# Patient Record
Sex: FEMALE | Race: WHITE
[De-identification: names, ages, dates, MRNs, and addresses within clinical notes are randomized per-mention and may not be internally consistent; named-entity substitution may affect disease eponyms.]

---

## 2018-04-22 ENCOUNTER — HOSPITAL ENCOUNTER (INPATIENT)
Dept: HOSPITAL 68 - ERH | Age: 83
LOS: 3 days | Discharge: HOME HEALTH | DRG: 190 | End: 2018-04-25
Attending: INTERNAL MEDICINE
Payer: COMMERCIAL

## 2018-04-22 VITALS — SYSTOLIC BLOOD PRESSURE: 150 MMHG | DIASTOLIC BLOOD PRESSURE: 80 MMHG

## 2018-04-22 VITALS — HEIGHT: 61 IN | BODY MASS INDEX: 32.1 KG/M2 | WEIGHT: 170 LBS

## 2018-04-22 VITALS — DIASTOLIC BLOOD PRESSURE: 50 MMHG | SYSTOLIC BLOOD PRESSURE: 148 MMHG

## 2018-04-22 DIAGNOSIS — E78.5: ICD-10-CM

## 2018-04-22 DIAGNOSIS — I10: ICD-10-CM

## 2018-04-22 DIAGNOSIS — J44.0: Primary | ICD-10-CM

## 2018-04-22 DIAGNOSIS — K21.9: ICD-10-CM

## 2018-04-22 DIAGNOSIS — J44.1: ICD-10-CM

## 2018-04-22 DIAGNOSIS — K59.00: ICD-10-CM

## 2018-04-22 DIAGNOSIS — J20.9: ICD-10-CM

## 2018-04-22 DIAGNOSIS — Z79.51: ICD-10-CM

## 2018-04-22 DIAGNOSIS — K58.9: ICD-10-CM

## 2018-04-22 DIAGNOSIS — Z88.8: ICD-10-CM

## 2018-04-22 DIAGNOSIS — E03.9: ICD-10-CM

## 2018-04-22 DIAGNOSIS — J18.9: ICD-10-CM

## 2018-04-22 DIAGNOSIS — Z79.82: ICD-10-CM

## 2018-04-22 LAB
ABSOLUTE GRANULOCYTE CT: 7.5 /CUMM (ref 1.4–6.5)
APTT BLD: 31 SEC (ref 25–37)
BASOPHILS # BLD: 0 /CUMM (ref 0–0.2)
BASOPHILS NFR BLD: 0.4 % (ref 0–2)
EOSINOPHIL # BLD: 0.1 /CUMM (ref 0–0.7)
EOSINOPHIL NFR BLD: 0.8 % (ref 0–5)
ERYTHROCYTE [DISTWIDTH] IN BLOOD BY AUTOMATED COUNT: 13.8 % (ref 11.5–14.5)
GRANULOCYTES NFR BLD: 79.6 % (ref 42.2–75.2)
HCT VFR BLD CALC: 42.2 % (ref 37–47)
LYMPHOCYTES # BLD: 1.4 /CUMM (ref 1.2–3.4)
MCH RBC QN AUTO: 32.2 PG (ref 27–31)
MCHC RBC AUTO-ENTMCNC: 33.8 G/DL (ref 33–37)
MCV RBC AUTO: 95.1 FL (ref 81–99)
MONOCYTES # BLD: 0.4 /CUMM (ref 0.1–0.6)
PLATELET # BLD: 197 /CUMM (ref 130–400)
PMV BLD AUTO: 9.4 FL (ref 7.4–10.4)
PROTHROMBIN TIME: 11.8 SEC (ref 9.4–12.5)
RED BLOOD CELL CT: 4.44 /CUMM (ref 4.2–5.4)
WBC # BLD AUTO: 9.4 /CUMM (ref 4.8–10.8)

## 2018-04-22 PROCEDURE — 2NASP: CPT

## 2018-04-22 NOTE — ED DYSPNEA/ASTHMA COMPLAINT
History of Present Illness
 
General
Chief Complaint: Dyspnea (COPD, CHF, Other)
Stated Complaint: BIBA SOB
Source: patient
Exam Limitations: no limitations
 
Vital Signs & Intake/Output
Vital Signs & Intake/Output
 Vital Signs
 
 
Date Time Temp Pulse Resp B/P B/P Pulse O2 O2 Flow FiO2
 
     Mean Ox Delivery Rate 
 
 1631 96.3 106 22 173/94  95 Nasal 1.0L 
 
       Cannula  
 
 1445 96.1 100 18 173/81  92 Nasal 2.0L 
 
       Cannula  
 
 1343      96 Nasal 1.0L 
 
       Cannula  
 
 1252 96.4 103 25 152/92  98 Room Air Room Air 
 
 
 
Allergies
Coded Allergies:
linaclotide (From LINZESS) (Severe, WHEEZING, SOB 18)
tiotropium (UNKNOWN 18)
 
Reconcile Medications
Albuterol Sulfate (Proair Hfa) 90 MCG HFA.AER.AD   2 PUF INH Q4-6 PRN PRN RESP. 
(Reported)
Aspirin (Ecotrin*) 81 MG TABLET.DR   1 TAB PO DAILY HEART/BLOOD  (Reported)
Escitalopram Oxalate 10 MG TABLET   1 TAB PO DAILY MENTAL HEALTH  (Reported)
Hydrochlorothiazide 12.5 MG CAPSULE   1 CAP PO DAILY DIURETIC/BP  (Reported)
Levothyroxine Sodium 100 MCG TABLET   1 TAB PO DAILY THYROID  (Reported)
Losartan Potassium 25 MG TABLET   1 TAB PO DAILY BP  (Reported)
Lovastatin 20 MG TABLET   1 TAB PO DAILY CHOLESTEROL  (Reported)
Multiple Vitamin (Multivitamins) 1 EACH TABLET   1 TAB PO DAILY SUPPLEMENT  (
Reported)
 
Triage Note:
PT BIBA FROM HOME FOR INCREASED SOB SINCE EARLY
THIS MORNING.  PT FELT GREAT YESTERDAY, TOOK A
LINZESS PILL LAST NIGHT FOR THE FIRST TIME AND SOB
STARTED AROUND 0400, SUBSIDED AND THEN STARTED
AGAIN APPROX 2-3 HOURS AGO AFTER PT ATE BREAKFAST.
PT GIVEN DUO NEB EN ROUTE.
Triage Nurses Notes Reviewed? yes
Onset: Abrupt
Duration: hour(s):, constant, getting worse
Timing: recent history
Severity: mild
HPI:
87-year-old female comes into the emergency room for shortness of breath.  
Symptoms began around 4 AM this morning.  She came in by ambulance.  He denies 
any fever chills.  Associated cough.  She started a new medication yesterday.  
She denies any chest pain.  Denies any fever or vomiting.  She has been sick 
with some upper rest of her symptoms for a couple weeks but those symptoms have 
gotten better.  She denies any vomiting.  Denies any other associated symptoms.
(Ghanshyam Richardson)
 
Past History
 
Travel History
Traveled to [place] past 21 day No
 
Medical History
Any Pertinent Medical History? see below for history
Cardiovascular: hypertension, hyperlipidemia
Respiratory: COPD
Gastrointestinal: GERD
Endocrine: HYPOTHYROID
Blood Disorders: NONE
Cancer(s): NONE
History of MRSA: No
History of VRE: No
History of CDIFF: Yes
Pneumonia Vaccine: 10/01/14
Influenza Vaccine: 10/01/14
 
Surgical History
Surgical History: non-contributory
 
Psychosocial History
Who do you live with Patient/Self
Services at Home None
What is your primary language English
Tobacco Use: Quit >30 days ago
 
Family History
Family History, If Any:
MOTHER (CVA and DM).
FATHER (CHF, renal stones and gall stones).
SON (DM and MI at 48 years).
 
Hx Contributory? No
(Ghanshyam Richardson)
 
Review of Systems
 
Review of Systems
Constitutional:
Reports: see HPI. 
EENTM:
Reports: no symptoms. 
Respiratory:
Reports: see HPI. 
Cardiovascular:
Reports: no symptoms. 
GI:
Reports: no symptoms. 
Genitourinary:
Reports: no symptoms. 
Musculoskeletal:
Reports: no symptoms. 
Skin:
Reports: no symptoms. 
Neurological/Psychological:
Reports: no symptoms. 
Hematologic/Endocrine:
Reports: no symptoms. 
Immunologic/Allergic:
Reports: no symptoms. 
All Other Systems: Reviewed and Negative
(Ghanshyam Richardson)
 
Physical Exam
 
Physical Exam
General Appearance: alert, awake, moderate distress
Head: atraumatic
Eyes:
Bilateral: normal appearance. 
Ears, Nose, Throat: normal ENT inspection, hearing grossly normal
Neck: normal inspection
Respiratory: decreased breath sounds, rhonchi, wheezing
Cardiovascular: regular rate/rhythm, tachycardia
Gastrointestinal: soft
Extremities: normal inspection
Neurologic/Psych: awake, alert, normal gait, normal mood/affect
Skin: intact, normal color
 
Core Measures
ACS in differential dx? No
CVA/TIA Diagnosis No
Sepsis Present: No
Sepsis Focused Exam Completed? No
(Ghanshyam Richardson)
 
Progress
Differential Diagnosis: asthma, AMI, bronchitis, costochondritis, CHF, COPD, 
musculoskeletal pain, pericarditis, pulmonary embolism, pneumonia, pneumothorax,
rib fracture, unstable angina
Plan of Care:
 Orders
 
 
Procedure Date/time Status
 
Heart Healthy Diet  B Active
 
Regular Diet  D Complete
 
ED Holding Orders  1611 Active
 
Vital Signs  1611 Active
 
Code Status  1611 Active
 
Patient Data  1609 Active
 
Admit to inpatient  1607 Active
 
BLOOD CULTURE  1419 Active
 
Telemetry/Cardiac Monitor  1258 Active
 
TROPONIN LEVEL  1258 Complete
 
PARTIAL THROMBOPLASTIN TIME  1258 Complete
 
PROTHROMBIN TIME  1258 Complete
 
COMPREHENSIVE METABOLIC PANEL  1258 Complete
 
CBC WITHOUT DIFFERENTIAL  1258 Complete
 
B-TYPE NATRIURETIC PEP (BNP)  1258 Complete
 
EKG  1258 Active
 
Intake & Output  1249 Active
 
 
 Current Medications
 
 
  Sig/No Start time  Last
 
Medication Dose  Stop Time Status Admin
 
Magnesium Sulfate 1 GM ONCE ONE  1345 AC 
 
(Mag Sulfate in D5)    1744  1402
 
Dextrose/Water 100 ML    
 
(D5W)     
 
 
 Laboratory Tests
 
 
 
18 1340:
Anion Gap 7, Estimated GFR > 60, BUN/Creatinine Ratio 44.0  H, Glucose 107  H, 
Calcium 9.2, Total Bilirubin 0.9, AST 28, ALT 26, Alkaline Phosphatase 59, 
Troponin I < 0.01, Pro-B-Natriuretic Pept 552  H, Total Protein 7.2, Albumin 3.9
, Globulin 3.3, Albumin/Globulin Ratio 1.2, PT 11.8, INR 1.08, APTT 31, CBC w 
Diff NO MAN DIFF REQ, RBC 4.44, MCV 95.1, MCH 32.2  H, MCHC 33.8, RDW 13.8, MPV 
9.4, Gran % 79.6  H, Lymphocytes % 15.0  L, Monocytes % 4.2, Eosinophils % 0.8, 
Basophils % 0.4, Absolute Granulocytes 7.5  H, Absolute Lymphocytes 1.4, 
Absolute Monocytes 0.4, Absolute Eosinophils 0.1, Absolute Basophils 0
 Microbiology
 1442  BLOOD: Blood Culture - RECD
 1420  BLOOD: Blood Culture - RECD
 
 
Diagnostic Imaging:
Viewed by Me: Radiology Read.  Discussed w/RAD: Radiology Read. 
Radiology Impression: PATIENT: LUCAS DOOLEY  MEDICAL RECORD NO: [id number] PRESENT 
AGE: 87  PATIENT ACCOUNT NO: [id number] : 30  LOCATION: [facility] ORDERING 
PHYSICIAN: Ghanshyam CABRERA     SERVICE DATE:  EXAM TYPE: RAD - 
XRY-PORTABLE CHEST XRAY EXAMINATION: XR PORTABLE CHEST CLINICAL INFORMATION: 
Shortness of breath. COMPARISON: Chest radiograph 10/22/2010. TECHNIQUE: 
Portable frontal view of the chest was obtained. FINDINGS: Mild prominence of 
the pulmonary interstitium bilaterally may represent sequela of minimal chronic 
lung disease. There is minimal reticular airspace opacity in the right lung base
which is nonspecific. No pleural effusion. Cardiomediastinal silhouette and 
pulmonary vasculature are within normal limits. No acute osseous finding. 
IMPRESSION: Normal right basilar airspace opacity which is nonspecific. This may
be artifactual given underpenetration or possibly represent early infiltrate. 
DICTATED BY: Johan Rodriguez MD  DATE/TIME DICTATED:18 
:RAD.CONCEPCION  DATE/TIME TRANSCRIBED:18 CONFIDENTIAL, 
DO NOT COPY WITHOUT APPROPRIATE AUTHORIZATION.  <Electronically signed in Other 
Vendor System>                                                                  
                     SIGNED BY: Johan Rodriguez MD 18 1408
Initial ED EKG: normal sinus rhythm, rate (106)
(Ghanshyam Richardson)
 
Departure
 
Departure
Disposition: STILL A PATIENT
Condition: Stable
Clinical Impression
Primary Impression: Pneumonia
Secondary Impressions: Bronchitis
Referrals:
Christopher CHIU,Wellington CHEUNG (PCP/Family)
 
Departure Forms:
Customer Survey
General Discharge Information
 
Admission Note
Spoke With:
Cisco Mccauley MD
Documentation of Exam:
Documentation of any treatments & extenuating circumstances including Concerns 
Regarding Discharge (functional status, medication knowledge or non-compliance, 
living conditions, etc.) that warrant an admission rather than observation:  
Patient will require IV steroids.  Supplemental oxygen.  Respiratory care.  
Breathing treatments.  Patient was very tachypneic and short of breath upon 
arrival.  Questionable pneumonia.  IV antibiotics.  Medically not safe for 
discharge.
 
(Ghanshyam Richardson)
 
Admission Note
Documentation of Exam:
Documentation of any treatments & extenuating circumstances including Concerns 
Regarding Discharge (functional status, medication knowledge or non-compliance, 
living conditions, etc.) that warrant an admission rather than observation: 
 
 
PA/NP Co-Sign Statement
Statement:
ED Attending supervision documentation-
 
[X] I saw and evaluated the patient. I have also reviewed all the pertinent lab 
results and diagnostic results. I agree with the findings and the plan of care 
as documented in the PA's/NP's documentation. 
 
[X] I have reviewed the ED Record and agree with the PA's/NP's documentation.
 
[] Additions or exceptions (if any) to the PAs/NP's note and plan are 
summarized below:
[Patient appears in respiratory distress.  Questionable pneumonia seen on chest 
x-ray.  Patient will need IV antibiotics, nebulizers, pulmonary consultation]
 
(Pat CHIU,Robbie CONTRERAS)
 
Critical Care Note
 
Critical Care Note
Critical Care Time: non-applicable
(Ruben CABRERA,Ghanshyam)

## 2018-04-22 NOTE — RADIOLOGY REPORT
EXAMINATION:
XR PORTABLE CHEST
 
CLINICAL INFORMATION:
Shortness of breath.
 
COMPARISON:
Chest radiograph 10/22/2010.
 
TECHNIQUE:
Portable frontal view of the chest was obtained.
 
FINDINGS:
Mild prominence of the pulmonary interstitium bilaterally may represent
sequela of minimal chronic lung disease. There is minimal reticular airspace
opacity in the right lung base which is nonspecific.
 
No pleural effusion.
 
Cardiomediastinal silhouette and pulmonary vasculature are within normal
limits.
 
No acute osseous finding.
 
IMPRESSION:
Normal right basilar airspace opacity which is nonspecific. This may be
artifactual given underpenetration or possibly represent early infiltrate.

## 2018-04-22 NOTE — HISTORY & PHYSICAL
Bob CHIU,[facility] 04/22/18 1619:
General Information and HPI
MD Statement:
I have seen and personally examined LUCAS DOOLEY and documented this H&P.
 
The patient is a 87 year old F who presented with a patient stated chief 
complaint of [SOB].
 
History of Present Illness:
87-year-old female with a past medical history of hypertension, hyperlipidemia, 
COPD, GERD, hypothyroidism, and IBS presenting for shortness breath.  The 
patient's son states that the symptoms started several weeks ago when the 
patient had signs of a upper respiratory infection.  The son states that the 
patient went to the PCP on the 13th and was given a Z-Valente and poor air.  He 
states that the symptoms occurred 3-4 days for the PCP visit.  He states that 
she got a flu test which was negative and she was diagnosed with a cold.  He 
states that the patient was fine yesterday.  States that the patient went out 
shopping and had dinner family without any issues.  The son states that the 
symptoms started yesterday when the patient was started on a trial of Lanzess 
for constipation.  He states that the patient had several bowel movements.  He 
states that after taking the medication the patient began having symptoms of 
GERD which then led to an acute reemergence of her shortness of breath.  The 
shortness of breath is associated with cough.  The patient denies any fevers or 
chills.
 
Allergies/Medications
Allergies:
Coded Allergies:
linaclotide (From LINZESS) (Severe, WHEEZING, SOB 04/22/18)
tiotropium (UNKNOWN 04/22/18)
 
Home Med list
Albuterol Sulfate (Proair Hfa) 90 MCG HFA.AER.AD   2 PUF INH Q4-6 PRN PRN RESP. 
(Reported)
Aspirin (Ecotrin*) 81 MG TABLET.DR   1 TAB PO DAILY HEART/BLOOD  (Reported)
Escitalopram Oxalate 10 MG TABLET   1 TAB PO DAILY MENTAL HEALTH  (Reported)
Hydrochlorothiazide 12.5 MG CAPSULE   1 CAP PO DAILY DIURETIC/BP  (Reported)
Levothyroxine Sodium 100 MCG TABLET   1 TAB PO DAILY THYROID  (Reported)
Losartan Potassium 25 MG TABLET   1 TAB PO DAILY BP  (Reported)
Lovastatin 20 MG TABLET   1 TAB PO DAILY CHOLESTEROL  (Reported)
Multiple Vitamin (Multivitamins) 1 EACH TABLET   1 TAB PO DAILY SUPPLEMENT  (
Reported)
 
 
Past History
 
Travel History
Traveled to [place] past 21 day No
 
Medical History
Cardiovascular: hypertension, hyperlipidemia
Respiratory: COPD
Gastrointestinal: GERD
Endocrine: HYPOTHYROID
Blood Disorders: NONE
Cancer(s): NONE
History of MRSA: No
History of VRE: No
History of CDIFF: Yes
Pneumonia Vaccine: 10/01/14
Influenza Vaccine: 10/01/14
 
Surgical History
Surgical History: non-contributory
 
Past Family/Social History
 
Family History
Relations & Conditions if any
MOTHER (CVA and DM).
FATHER (CHF, renal stones and gall stones).
SON (DM and MI at 48 years).
 
 
Psychosocial History
Services at Home: None
 
Review of Systems
 
Review of Systems
Constitutional:
Reports: see HPI. 
 
Exam & Diagnostic Data
Last 24 Hrs of Vital Signs/I&O
 Vital Signs
 
 
Date Time Temp Pulse Resp B/P B/P Pulse O2 O2 Flow FiO2
 
     Mean Ox Delivery Rate 
 
04/22 2246 97.8 90 20 148/50  96 Nasal  
 
       Cannula  
 
04/22 2057       Nasal 2.0L 
 
       Cannula  
 
04/22 1835      98 Nasal 2.0L 
 
       Cannula  
 
04/22 1754 98.6 100 22 150/80  96 Nasal  
 
       Cannula  
 
04/22 1631 96.3 106 22 173/94  95 Nasal 1.0L 
 
       Cannula  
 
04/22 1445 96.1 100 18 173/81  92 Nasal 2.0L 
 
       Cannula  
 
04/22 1343      96 Nasal 1.0L 
 
       Cannula  
 
04/22 1252 96.4 103 25 152/92  98 Room Air Room Air 
 
 
 Intake & Output
 
 
 04/22 1600 04/22 0800 04/22 0000
 
Intake Total 0  
 
Output Total   
 
Balance 0  
 
    
 
Intake, Oral 0  
 
 
 
 
Physical Exam
General Appearance Alert, Oriented X3, Cooperative, No Acute Distress, appears 
fatigued
HEENT reddish/purplish ?circular pattern around eyes b/l
Cardiovascular Regular Rate, Normal S1, Normal S2
Lungs decreased breath sounds bilaterally.  Inspiratory crackles bilaterally in 
mid and lower base.
Abdomen Normal Bowel Sounds, Soft, No Tenderness
Extremities 2+ radial pulses
Last 24 Hrs of Labs/Raji:
 Laboratory Tests
 
04/22/18 2010:
Troponin I 0.05
 
04/22/18 2010:
Lactic Acid 1.5
 
04/22/18 1340:
Anion Gap 7, Estimated GFR > 60, BUN/Creatinine Ratio 44.0  H, Glucose 107  H, 
Calcium 9.2, Total Bilirubin 0.9, AST 28, ALT 26, Alkaline Phosphatase 59, 
Troponin I < 0.01, Pro-B-Natriuretic Pept 552  H, Total Protein 7.2, Albumin 3.9
, Globulin 3.3, Albumin/Globulin Ratio 1.2, PT 11.8, INR 1.08, APTT 31, CBC w 
Diff NO MAN DIFF REQ, RBC 4.44, MCV 95.1, MCH 32.2  H, MCHC 33.8, RDW 13.8, MPV 
9.4, Gran % 79.6  H, Lymphocytes % 15.0  L, Monocytes % 4.2, Eosinophils % 0.8, 
Basophils % 0.4, Absolute Granulocytes 7.5  H, Absolute Lymphocytes 1.4, 
Absolute Monocytes 0.4, Absolute Eosinophils 0.1, Absolute Basophils 0
 Microbiology
04/22 1901  URINE ROUT: Legionella Antigen - COLB
04/22 1901  URINE ROUT: Streptococcus pneumoniae Antigen (M - COLB
04/22 1442  BLOOD: Blood Culture - RECD
04/22 1420  BLOOD: Blood Culture - RECD
 
 
 
Assessment/Plan
Assessment:
87-year-old female with a past medical history of hypertension, hyperlipidemia, 
COPD, GERD, hypothyroidism, and IBS presenting for shortness breath.
 
#Shortness of breath secondary to pneumonia and bronchitis/COPD
Patient received ceftriaxone and azithromycin in the ED.
Also received IV Solu-Medrol in the ED
Chest x-ray:right basilar airspace opacity which is nonspecific. This may be
artifactual given underpenetration or possibly represent early infiltrate
WBC 9.4
Lactic acid 1.5
Troponin 0.01, .05
ProBNP 552
 
-f/u trop ekg x3
-f/u CT chest
-f/u UA and urinary antigens
-Continue ceftriaxone and azithromycin
-Continue TRC nebs
-Consider echo if no improvement
 
#HTN
-cont losartan, hydro-chlorothiazide,
 
#Chronic medical problems
-Continue  aspirin, levothyroxine, escitalopram, Protonix, atorvastatin
 
#FULL CODE
 
#DVT prophylaxisLovenox
 
As Ranked By This Provider
Problem List:
 1. Bronchitis
 
 2. Pneumonia
 
 
Core Measures/Misc (9/17)
 
Acute Coronary Syndrome
ACS Diagnosis: No
 
Congestive Heart Failure
Congestive Heart Failure Diagnosis No
 
Cerebrovascular Accident
CVA/TIA Diagnosis: No
 
VTE (View Protocol)
VTE Risk Factors Acute Medical Illness
No Mechanical VTE Prophylaxis d/t Other
No VTE Pharm Prophylaxis d/t NA PharmProphylax ordered
 
Sepsis (View protocol)
Sepsis Present: No
 
 
Cisco Mccauley 04/22/18 1716:
Attending MD Review Statement
 
Attending Statement
Attending MD Statement: examined this patient, discuss w/resident/PA/NP, agreed 
w/resident/PA/NP, discussed with family, reviewed EMR data (avail), discussed 
with nursing, discussed with case mgmt, reviewed images, amended to note
Attending Assessment/Plan:
87-year-old female comes for shortness of breath BIBEMS. Symptoms started 4 AM 
this morning.      She has been sick with some upper rest of her symptoms for a 
couple weeks. She denies any chest pain.  Denies any fever or vomiting. Patient 
is on oxygen supplementation. Vitals with borderline tachycardia and elevated 
bp.
 
Labs with probnp 500, Cr wnl, WBC 9, h/h stable. LFTs wnl. Trop x1 negative
 
Chest xray with early inflitrates. 
 
Patient being admitted for community acquired pneumonia and respiratroy 
insufficiency. Patient started on iv antibiotics, oxygen supplementaiton, obtain
ct chest, TRCs. Serial cardiac enzymes. Lactic acid. Consider ECHO if no 
improvement. Resume bp meds to control bp. gi/dvt prophyalxis 
 
 
[facility] 04/23/[facility] 0135:
Resident Review Statement
Resident Statement: examined this patient, discussed with intern, agreed with 
intern
Other Findings:
The patient is an 87-year-old woman with a past medical history of hypertension,
hyperlipidemia, COPD diagnosed in the 1960s, GERD, hypothyroidism, and IBS 
presenting with sudden onset of shortness breath since last night. 
 
Prior to current symptoms patient had an URTI 2 weeks ago and was treated with a
z pack and proair inhaler on 4/13 by her PCP with negative flu test. Her 
symptoms improved but she developed sudden onset of shortness of breath last 
night after taking linzess for constipation. She also notes severe wheezing. She
denied fever, chills, chest pain, palpitations, nausea or vomitting. Her 
shortness of breath improved after nebulizer treatment in the ER. Physical exam 
significant for bilateral scattered wheeze and reduced air entry in chest. No 
crackles heard. Labs show normal white count but her chest x ray shows right 
lung base questionable consolidation. Overall, her symptoms are consistent with 
an atypical or developing pneumonia or acute bronchitis.
 
Problem list
1. Shortness of breath secondary to probable atypical pneumonia
2. Acute bronchitis
3. Hypertension
4. Hypothyroidism
 
Plan
* Admit to general medicine
* Continue IV ceftriaxone 1 g daily and IV azithromycin 500 mg daily
* Follow up blood cultures
* Sputum culture
* Urine legionella and strep pneumo antigen
* Would pursue a non contrast CT chest to investigate early pneumonia changes
* Monitor vital signs closely
* TRC evaluation for nebulizer therapy (Note patient had a reaction to spiriva 
in the past)
* Consider continuing IV solumedrol in the AM. Patient received 125 mg once in 
the ER. 
* Serial EKG and troponin to rule out ACS
* If no improvement in symptoms will consider echocardiogram and cardiology 
evaluation
* Continue home meds for HTN  with losartan and HCTZ
* Continue synthroid 0.1 mg daily
* GI prophylaxis with IV protonix 40 mg daily 
* DVT prophylaxis with SC lovenox
* Patient is full code

## 2018-04-23 VITALS — SYSTOLIC BLOOD PRESSURE: 156 MMHG | DIASTOLIC BLOOD PRESSURE: 86 MMHG

## 2018-04-23 VITALS — DIASTOLIC BLOOD PRESSURE: 64 MMHG | SYSTOLIC BLOOD PRESSURE: 130 MMHG

## 2018-04-23 VITALS — SYSTOLIC BLOOD PRESSURE: 128 MMHG | DIASTOLIC BLOOD PRESSURE: 80 MMHG

## 2018-04-23 VITALS — DIASTOLIC BLOOD PRESSURE: 88 MMHG | SYSTOLIC BLOOD PRESSURE: 150 MMHG

## 2018-04-23 LAB
ABSOLUTE GRANULOCYTE CT: 5.7 /CUMM (ref 1.4–6.5)
BASOPHILS # BLD: 0 /CUMM (ref 0–0.2)
BASOPHILS NFR BLD: 0.7 % (ref 0–2)
EOSINOPHIL # BLD: 0 /CUMM (ref 0–0.7)
EOSINOPHIL NFR BLD: 0 % (ref 0–5)
ERYTHROCYTE [DISTWIDTH] IN BLOOD BY AUTOMATED COUNT: 13.4 % (ref 11.5–14.5)
GRANULOCYTES NFR BLD: 85 % (ref 42.2–75.2)
HCT VFR BLD CALC: 39.3 % (ref 37–47)
LYMPHOCYTES # BLD: 0.7 /CUMM (ref 1.2–3.4)
MCH RBC QN AUTO: 32.1 PG (ref 27–31)
MCHC RBC AUTO-ENTMCNC: 33.8 G/DL (ref 33–37)
MCV RBC AUTO: 95 FL (ref 81–99)
MONOCYTES # BLD: 0.3 /CUMM (ref 0.1–0.6)
PLATELET # BLD: 190 /CUMM (ref 130–400)
PMV BLD AUTO: 9.5 FL (ref 7.4–10.4)
RED BLOOD CELL CT: 4.14 /CUMM (ref 4.2–5.4)
WBC # BLD AUTO: 6.8 /CUMM (ref 4.8–10.8)

## 2018-04-23 NOTE — PN- HOUSESTAFF
Subjective
Follow-up For:
Pneumonia
COPD
Subjective:
No acute events overnight.  Patient states that she had some shortness of breath
which improved after nebulizers this morning.
 
Review of Systems
Constitutional:
Reports: see HPI. 
 
Objective
Last 24 Hrs of Vital Signs/I&O
 Vital Signs
 
 
Date Time Temp Pulse Resp B/P B/P Pulse O2 O2 Flow FiO2
 
     Mean Ox Delivery Rate 
 
 1011      98 Nasal 2.0L 
 
       Cannula  
 
 0827 98.4 98 22 150/88     
 
 0800      98 Nasal 2.0L 
 
       Cannula  
 
 0632 98.4 98 22 150/88  94 Nasal  
 
       Cannula  
 
 0542      97 Nasal 2.0L 
 
       Cannula  
 
 0200 98.1 74 18 128/80  98 Room Air  
 
 0000       Nasal 2.0L 
 
       Cannula  
 
 2246 97.8 90 20 148/50  96 Nasal  
 
       Cannula  
 
 2057       Nasal 2.0L 
 
       Cannula  
 
 1835      98 Nasal 2.0L 
 
       Cannula  
 
 1754 98.6 100 22 150/80  96 Nasal  
 
       Cannula  
 
 1631 96.3 106 22 173/94  95 Nasal 1.0L 
 
       Cannula  
 
 1445 96.1 100 18 173/81  92 Nasal 2.0L 
 
       Cannula  
 
 1343      96 Nasal 1.0L 
 
       Cannula  
 
 
 Intake & Output
 
 
  1600  0800  0000
 
Intake Total  100 220
 
Output Total 100  
 
Balance -100 100 220
 
    
 
Intake, IV   20
 
Intake, Oral  100 200
 
Number  0 
 
Bowel   
 
Movements   
 
Output, Urine 100  
 
Patient  171 lb 180 lb
 
Weight   
 
Weight  Bed scale Reported by Patient
 
Measurement   
 
Method   
 
 
 
 
Physical Exam
General Appearance: Alert, Oriented X3, Cooperative
Cardiovascular: Normal S1, Normal S2, tachycardia
Lungs: decreased breath sounds, inspiratory wheezing
Abdomen: Normal Bowel Sounds, Soft, No Tenderness
Vascular: 2+ radial pulses
Current Medications:
 Current Medications
 
 
  Sig/No Start time  Last
 
Medication Dose Route Stop Time Status Admin
 
Albuterol Sulfate 3 ML  AC 
 
  INH   0950
 
Aspirin Buffered 81 MG DAILY  0900 AC 
 
  PO   0827
 
Atorvastatin Calcium 5 MG 1700  1800 AC 
 
  PO   1937
 
Azithromycin 500 MG DAILY@1530  1530 AC 
 
Sodium Chloride 250 ML IV   
 
Azithromycin 0 .STK-MED ONE  1713 DC 
 
  PO   
 
Azithromycin 500 MG ONCE ONE  1415 DC 
 
Dextrose/Water 250 ML IV  1514  1546
 
Ceftriaxone Sodium 1,000 MG DAILY@1515  1515 AC 
 
  IV   
 
Ceftriaxone Sodium 0 .STK-MED ONE  1459 DC 
 
  .ROUTE   
 
Ceftriaxone Sodium 1,000 MG ONCE ONE  1415 DC 
 
  IV  1416  1514
 
Diphenhydramine HCl 0 .STK-MED ONE  1311 DC 
 
  .ROUTE   
 
Enoxaparin Sodium 40 MG DAILY  1814 AC 
 
  SC   0827
 
Escitalopram Oxalate 10 MG AT BEDTIME  2100 AC 
 
  PO   2144
 
Hydrochlorothiazide 12.5 MG DAILY  0900 AC 
 
  PO   0826
 
Levothyroxine Sodium 0.1 MG DAILY AC  0700 AC 
 
  PO   0640
 
Losartan Potassium 25 MG DAILY  0900 AC 
 
  PO   0827
 
Magnesium Sulfate 1 GM ONCE ONE  1345 DC 
 
Dextrose/Water 100 ML IV  1744  1402
 
Methylprednisolone 40 MG Q12  0900 AC 
 
  IV   1016
 
Methylprednisolone 0 .STK-MED ONE  1312 DC 
 
  .ROUTE   
 
Metronidazole 0 .STK-MED ONE  1713 DC 
 
  PO   
 
Multivitamins  1 TAB DAILY  0900 AC 
 
Therapeutic  PO   0826
 
Omeprazole 20 MG DAILY AC  1830 DC 
 
  PO   
 
Pantoprazole Sodium 40 MG DAILY  1945 AC 
 
  IV   0827
 
 
 
 
Last 24 Hrs of Lab/Raji Results
Last 24 Hrs of Labs/Mics:
 Laboratory Tests
 
18 0925:
Troponin I 0.05
 
18 0920:
Urine Color YEL, Urine Clarity HAZY  H, Urine pH 6.0, Ur Specific Gravity 1.025,
Urine Protein 30  H, Urine Ketones NEG, Urine Nitrite NEG, Urine Bilirubin NEG, 
Urine Urobilinogen 0.2, Ur Leukocyte Esterase NEG, Ur Microscopic SEDIMENT 
EXAMINED, Urine RBC 1-3, Urine WBC 3-5  H, Ur Epithelial Cells PACKD  H, Urine 
Bacteria RARE  H, Hyaline Casts 1-3  H, Granular Casts 1-3  H, Urine Mucus FEW, 
Urine Hemoglobin NEG, Urine Glucose NEG
 
18 0200:
Anion Gap 6, Estimated GFR > 60, BUN/Creatinine Ratio 30.0  H, Troponin I 0.06, 
CBC w Diff MAN DIFF ORDERED, RBC 4.14  L, MCV 95.0, MCH 32.1  H, MCHC 33.8, RDW 
13.4, MPV 9.5, Gran % 85.0  H, Lymphocytes % 9.7  L, Monocytes % 4.6, 
Eosinophils % 0, Basophils % 0.7, Absolute Granulocytes 5.7, Segmented 
Neutrophils 78  H, Band Neutrophils 3, Absolute Lymphocytes 0.7  L, Lymphocytes 
16  L, Monocytes 3, Absolute Monocytes 0.3, Absolute Eosinophils 0, Absolute 
Basophils 0, Platelet Estimate ADEQUATE, Anisocytosis 1+, Stomatocytes 1+
 
18 2010:
Troponin I 0.05
 
18 2010:
Lactic Acid 1.5
 
18 1340:
Anion Gap 7, Estimated GFR > 60, BUN/Creatinine Ratio 44.0  H, Glucose 107  H, 
Calcium 9.2, Total Bilirubin 0.9, AST 28, ALT 26, Alkaline Phosphatase 59, 
Troponin I < 0.01, Pro-B-Natriuretic Pept 552  H, Total Protein 7.2, Albumin 3.9
, Globulin 3.3, Albumin/Globulin Ratio 1.2, PT 11.8, INR 1.08, APTT 31, CBC w 
Diff NO MAN DIFF REQ, RBC 4.44, MCV 95.1, MCH 32.2  H, MCHC 33.8, RDW 13.8, MPV 
9.4, Gran % 79.6  H, Lymphocytes % 15.0  L, Monocytes % 4.2, Eosinophils % 0.8, 
Basophils % 0.4, Absolute Granulocytes 7.5  H, Absolute Lymphocytes 1.4, 
Absolute Monocytes 0.4, Absolute Eosinophils 0.1, Absolute Basophils 0
 Microbiology
920  URINE ROUT: Legionella Antigen - COMP
920  URINE ROUT: Streptococcus pneumoniae Antigen (M - COMP
 07  LOWER RESP: Respiratory Culture - COLB
 07  LOWER RESP: Gram Stain - COLB
 1442  BLOOD: Blood Culture - RECD
 142  BLOOD: Blood Culture - RECD
 
 
 
Assessment/Plan
Assessment:
87-year-old female with a past medical history of hypertension, hyperlipidemia, 
COPD, GERD, hypothyroidism, and IBS presenting for shortness breath.
 
#Shortness of breath secondary to pneumonia and bronchitis/COPD
Patient received ceftriaxone and azithromycin, IV Solu-Medrol in ED
Chest x-ray:right basilar airspace opacity which is nonspecific. may be 
artifactual or possibly represent early infiltrate
WBC 9.4 -> 6.8
Lactic acid 1.5
Troponin 0.01, .05, .06, .05
ProBNP 552
flu, urinary antigens negative
CT chest: emphysema with prominent anterior right middle lobe bleb
 
-f/u echo
-Follow up speech and swallow eval for possible aspiration and history of 
dysphasia
-Continue IV steroids every 12
-Start chest PT
-Continue ceftriaxone and azithromycin. STOP if cx negative tomorrow
-Continue TRC nebs
-Consider echo if no improvement
 
#HTN
-cont losartan, hydro-chlorothiazide,
 
#Chronic medical problems
-Continue  aspirin, levothyroxine, escitalopram, Protonix, atorvastatin
 
#FULL CODE
 
#DVT prophylaxisLovenox
Problem List:
 1. Pneumonia
 
 2. Bronchitis
 
Pain Ratin
Pain Location:
none
Pain Goal: Pain 4 or less
Pain Plan:
pathway
Tomorrow's Labs & Rationales:
none

## 2018-04-23 NOTE — CT SCAN REPORT
EXAMINATION:
CT CHEST WITHOUT CONTRAST
 
CLINICAL INFORMATION:
Cough, wheeze and sudden shortness of breath.
 
COMPARISON:
Chest x-ray same day and CT abdomen pelvis 01/18/2015
 
TECHNIQUE:
Multidetector volumetric CT imaging of the chest was done. Axial MIP volume
rendering provided. Sagittal and coronal reformatted images were obtained.
 
DLP:
394 mGy-cm
 
FINDINGS:
The heart is normal in size. Coronary artery calcifications are present. No
pericardial effusion. Thoracic aorta is normal in caliber and demonstrates
mild to moderate atherosclerotic disease.
 
Central airways are patent. Lungs are well aerated. No lobar consolidation.
Similar scarring of the right middle lobe and right lung base. Mild
emphysematous changes with a suspected approximately 4.5 cm bleb within the
anterior right middle lobe which was incompletely visualized on CT abdomen
pelvis imaging from January 2015. A few punctate nodules of the lung bases
are stable.
 
Visualized portion of the upper abdomen are grossly unremarkable.
 
Diffuse degenerative changes of the spine.
 
 
IMPRESSION:
1.  No lobar consolidation or pleural effusion.
2.  Mild emphysema with prominent anterior right middle lobe bleb.

## 2018-04-23 NOTE — PN- ATT ADDEND
Attending Addendum
Attending Brief Note
Patient seen and examined, feels better overall. Breathing has improved. Did 
mention that she has some difficulty swallowing. 
 
Vital Signs
 
 
Date Time Temp Pulse Resp B/P B/P Pulse O2 O2 Flow FiO2
 
     Mean Ox Delivery Rate 
 
04/23 1011      98 Nasal 2.0L 
 
       Cannula  
 
04/23 0827 98.4 98 22 150/88     
 
04/23 0800      98 Nasal 2.0L 
 
       Cannula  
 
04/23 0632 98.4 98 22 150/88  94 Nasal  
 
       Cannula  
 
04/23 0542      97 Nasal 2.0L 
 
       Cannula  
 
04/23 0200 98.1 74 18 128/80  98 Room Air  
 
04/23 0000       Nasal 2.0L 
 
       Cannula  
 
04/22 2246 97.8 90 20 148/50  96 Nasal  
 
       Cannula  
 
04/22 2057       Nasal 2.0L 
 
       Cannula  
 
04/22 1835      98 Nasal 2.0L 
 
       Cannula  
 
04/22 1754 98.6 100 22 150/80  96 Nasal  
 
       Cannula  
 
04/22 1631 96.3 106 22 173/94  95 Nasal 1.0L 
 
       Cannula  
 
04/22 1445 96.1 100 18 173/81  92 Nasal 2.0L 
 
       Cannula  
 
04/22 1343      96 Nasal 1.0L 
 
       Cannula  
 
04/22 1252 96.4 103 25 152/92  98 Room Air Room Air 
 
 
on exam; 
aox3, nad. 
cv: s1,s2, rrr
resp; decreased bs overall. 
abd; soft, nt, bs+
ext; no edema
 
 Laboratory Tests
 
 
 04/23 04/23
 
 0925 0920
 
Chemistry  
 
  Troponin I Pending 
 
Urines  
 
  Urine Color (YEL,AMB,STR)  YEL
 
  Urine Clarity (CLEAR)  HAZY  H
 
  Urine pH (5.0 - 8.0)  6.0
 
  Ur Specific Gravity (1.001 - 1.035)  1.025
 
  Urine Protein (NEG,<30 MG/DL)  30  H
 
  Urine Ketones (NEG)  NEG
 
  Urine Nitrite (NEG)  NEG
 
  Urine Bilirubin (NEG)  NEG
 
  Urine Urobilinogen (0.1  -  1.0 EU/dl)  0.2
 
  Ur Leukocyte Esterase (NEG)  NEG
 
  Ur Microscopic  SEDIMENT EXAMINED
 
  Urine RBC (0 - 5 /HPF)  1-3
 
  Urine WBC (0 - 2 /HPF)  3-5  H
 
  Ur Epithelial Cells (NONE,FEW)  PACKD  H
 
  Urine Bacteria (NEG/NONE)  RARE  H
 
  Hyaline Casts (0/LPF)  1-3  H
 
  Granular Casts (NONE /LPF)  1-3  H
 
  Urine Mucus (FEW,NONE)  FEW
 
  Urine Hemoglobin (NEG)  NEG
 
  Urine Glucose (N MG/DL)  NEG
 
 
 
 
 04/23 04/22 04/22
 
 0200 2010 2010
 
Chemistry   
 
  Sodium (137 - 145 mmol/L) 138  
 
  Potassium (3.5 - 5.1 mmol/L) 3.8  
 
  Chloride (98 - 107 mmol/L) 97  L  
 
  Carbon Dioxide (22 - 30 mmol/L) 34  H  
 
  Anion Gap (5 - 16) 6  
 
  BUN (7 - 17 mg/dL) 15  
 
  Creatinine (0.5 - 1.0 mg/dL) 0.5  
 
  Estimated GFR (>60 ml/min) > 60  
 
  BUN/Creatinine Ratio (7 - 25 %) 30.0  H  
 
  Lactic Acid (0.7 - 2.1 mmol/L)   1.5
 
  Troponin I (< 0.11 ng/ml) 0.06 0.05 
 
Hematology   
 
  CBC w Diff MAN DIFF ORDERED  
 
  WBC (4.8 - 10.8 /CUMM) 6.8  
 
  RBC (4.20 - 5.40 /CUMM) 4.14  L  
 
  Hgb (12.0 - 16.0 G/DL) 13.3  
 
  Hct (37 - 47 %) 39.3  
 
  MCV (81.0 - 99.0 FL) 95.0  
 
  MCH (27.0 - 31.0 PG) 32.1  H  
 
  MCHC (33.0 - 37.0 G/DL) 33.8  
 
  RDW (11.5 - 14.5 %) 13.4  
 
  Plt Count (130 - 400 /CUMM) 190  
 
  MPV (7.4 - 10.4 FL) 9.5  
 
  Gran % (42.2 - 75.2 %) 85.0  H  
 
  Lymphocytes % (20.5 - 51.1 %) 9.7  L  
 
  Monocytes % (1.7 - 9.3 %) 4.6  
 
  Eosinophils % (0 - 5 %) 0  
 
  Basophils % (0.0 - 2.0 %) 0.7  
 
  Absolute Granulocytes (1.4 - 6.5 /CUMM) 5.7  
 
  Segmented Neutrophils (42.2 - 75.2 %) 78  H  
 
  Band Neutrophils (0.0 - 5.0 %) 3  
 
  Absolute Lymphocytes (1.2 - 3.4 /CUMM) 0.7  L  
 
  Lymphocytes (20.5 - 51.1 %) 16  L  
 
  Monocytes (1.7 - 9.3 %) 3  
 
  Absolute Monocytes (0.10 - 0.60 /CUMM) 0.3  
 
  Absolute Eosinophils (0.0 - 0.7 /CUMM) 0  
 
  Absolute Basophils (0.0 - 0.2 /CUMM) 0  
 
  Platelet Estimate (ADEQUATE) ADEQUATE  
 
  Anisocytosis 1+  
 
  Stomatocytes 1+  
 
 
 
 
 04/22
 
 1340
 
Chemistry 
 
  Sodium (137 - 145 mmol/L) 137
 
  Potassium (3.5 - 5.1 mmol/L) 3.9
 
  Chloride (98 - 107 mmol/L) 97  L
 
  Carbon Dioxide (22 - 30 mmol/L) 34  H
 
  Anion Gap (5 - 16) 7
 
  BUN (7 - 17 mg/dL) 22  H
 
  Creatinine (0.5 - 1.0 mg/dL) 0.5
 
  Estimated GFR (>60 ml/min) > 60
 
  BUN/Creatinine Ratio (7 - 25 %) 44.0  H
 
  Glucose (65 - 99 mg/dL) 107  H
 
  Calcium (8.4 - 10.2 mg/dL) 9.2
 
  Total Bilirubin (0.2 - 1.3 mg/dL) 0.9
 
  AST (14 - 36 U/L) 28
 
  ALT (9 - 52 U/L) 26
 
  Alkaline Phosphatase (<127 U/L) 59
 
  Troponin I (< 0.11 ng/ml) < 0.01
 
  Pro-B-Natriuretic Pept (<125 pg/mL) 552  H
 
  Total Protein (6.3 - 8.2 g/dL) 7.2
 
  Albumin (3.5 - 5.0 g/dL) 3.9
 
  Globulin (1.9 - 4.2 gm/dL) 3.3
 
  Albumin/Globulin Ratio (1.1 - 2.2 %) 1.2
 
Coagulation 
 
  PT (9.4 - 12.5 SEC) 11.8
 
  INR (0.90 - 1.19) 1.08
 
  APTT (25 - 37 SEC) 31
 
Hematology 
 
  CBC w Diff NO MAN DIFF REQ
 
  WBC (4.8 - 10.8 /CUMM) 9.4
 
  RBC (4.20 - 5.40 /CUMM) 4.44
 
  Hgb (12.0 - 16.0 G/DL) 14.3
 
  Hct (37 - 47 %) 42.2
 
  MCV (81.0 - 99.0 FL) 95.1
 
  MCH (27.0 - 31.0 PG) 32.2  H
 
  MCHC (33.0 - 37.0 G/DL) 33.8
 
  RDW (11.5 - 14.5 %) 13.8
 
  Plt Count (130 - 400 /CUMM) 197
 
  MPV (7.4 - 10.4 FL) 9.4
 
  Gran % (42.2 - 75.2 %) 79.6  H
 
  Lymphocytes % (20.5 - 51.1 %) 15.0  L
 
  Monocytes % (1.7 - 9.3 %) 4.2
 
  Eosinophils % (0 - 5 %) 0.8
 
  Basophils % (0.0 - 2.0 %) 0.4
 
  Absolute Granulocytes (1.4 - 6.5 /CUMM) 7.5  H
 
  Absolute Lymphocytes (1.2 - 3.4 /CUMM) 1.4
 
  Absolute Monocytes (0.10 - 0.60 /CUMM) 0.4
 
  Absolute Eosinophils (0.0 - 0.7 /CUMM) 0.1
 
  Absolute Basophils (0.0 - 0.2 /CUMM) 0
 
 
A/P: 86 y/o F with pmh sig for hypertension, hyperlipidemia, COPD, GERD, 
hypothyroidism, and IBS admitted with shortness of breath, possible community-
acquired pneumonia or bronchitis as well as acute COPD exacerbation. 
 
We have added steroids.  Patient with swallow evaluation.  If all of her 
cultures remain negative then will consider stopping antibiotics.  There is no 
lobar consolidation on the chest CT.  Please get echo.  Follow-up troponins.
Continue other current meds.
DVT px; Lovenox. 
PT eval.

## 2018-04-24 VITALS — SYSTOLIC BLOOD PRESSURE: 136 MMHG | DIASTOLIC BLOOD PRESSURE: 72 MMHG

## 2018-04-24 VITALS — DIASTOLIC BLOOD PRESSURE: 80 MMHG | SYSTOLIC BLOOD PRESSURE: 160 MMHG

## 2018-04-24 VITALS — SYSTOLIC BLOOD PRESSURE: 150 MMHG | DIASTOLIC BLOOD PRESSURE: 60 MMHG

## 2018-04-24 NOTE — PN- ATT ADDEND
Attending Addendum
Attending Brief Note
Patient seen and examined, offers no complaints.  She still has the feeling 
SOMETHING gets stuck in the throat.  She says that she gets anxiety about 
everything.  Housestaff also spoke with her son this morning who confirms that 
patient is a very anxious person overall.  We told her about the barium swallow 
an echocardiogram this morning.
 
Vital Signs
 
 
Date Time Temp Pulse Resp B/P B/P Pulse O2 O2 Flow FiO2
 
     Mean Ox Delivery Rate 
 
04/24 0919 97.6 73 20 136/72     
 
04/24 0901      98 Room Air Room Air 
 
04/24 0539 97.6 73 20 136/72  93 Room Air  
 
04/23 2154 98.1 84 18 156/86  95   
 
04/23 1900      94 Room Air  
 
04/23 1428 98.5 85 20 130/64  96   
 
04/23 1348      96 Room Air  
 
 
on exam; 
aox3, nad. 
cv: s1,s2, rrr
resp; decreased bs overall. 
abd; soft, nt, bs+
ext; no edema
 
no labs. 
 
A/P;  88 y/o F with pmh sig for hypertension, hyperlipidemia, COPD, GERD, 
hypothyroidism, and IBS admitted with shortness of breath, less likely community
-acquired pneumonia. Andreas has acute bronchitis as well as acute COPD 
exacerbation. 
 
Patient to get modified barium swallow.  Patient with echocardiogram.  Continue 
prednisone and will start the taper.  Continue azithromycin orally.  Continue 
TRC nebs and other current meds.  Agree with switching PPI to oral.  Patient on 
Lovenox for DVT prophylaxis.  PT eval will be ordered.  Will determine what 
physical therapy will recommend in terms of her disposition.

## 2018-04-24 NOTE — RADIOLOGY REPORT
EXAMINATION:
XR MODIFIED BARIUM SWALLOW
 
CLINICAL INFORMATION:
Signs and symptoms of aspiration.
 
COMPARISON:
None.
 
TECHNIQUE:
A modified barium swallow was performed with speech pathologist in
attendance. Pur?e, honey thick, nectar thick, thin, bread, and cracker
consistencies were given to the patient and the swallowing mechanism was
observed fluoroscopically with several spot films taken using the last image
hold feature.
 
FLUOROSCOPY TIME:
2 minutes 44 seconds.
 
FINDINGS:
With all consistencies, the oral phase of swallowing is normal with no
difficulty in the oral bolus formation or posterior propagation. With thin
liquids, there is trace penetration of contrast seen without sensation. No
penetration is observed with puree, honey, nectar, bread, or cracker
consistency. No laryngeal or nasopharyngeal aspiration seen. Mild pooling of
contrast is noted in the valleculae with all consistencies.
 
IMPRESSION:
 
1. Silent trace penetration of contrast is seen with thin liquids.
2. Mild pooling of contrast in the valleculae with all consistencies.
3. Speech pathologist assessment issued separately.

## 2018-04-24 NOTE — PN- HOUSESTAFF
Subjective
Follow-up For:
Bronchitis complicated with COPD.
Complaints: on and off cough
Subjective:
Patient seen and examined at bedside. No overnight events.  Patient complains of
cough with whitish sputum production early morning.  Otherwise she feels fine.  
She slept okay.  She denies fever, shortness of breath, chest pain
 
Review of Systems
Constitutional:
Reports: no symptoms, see HPI. 
 
Objective
Last 24 Hrs of Vital Signs/I&O
 Vital Signs
 
 
Date Time Temp Pulse Resp B/P B/P Pulse O2 O2 Flow FiO2
 
     Mean Ox Delivery Rate 
 
 0919 97.6 73 20 136/72     
 
 0901      98 Room Air Room Air 
 
 0539 97.6 73 20 136/72  93 Room Air  
 
 2154 98.1 84 18 156/86  95   
 
 1900      94 Room Air  
 
 1428 98.5 85 20 130/64  96   
 
 1348      96 Room Air  
 
 
 Intake & Output
 
 
  1600  0800  0000
 
Intake Total  130 250
 
Output Total   
 
Balance  130 250
 
    
 
Intake, IV  10 10
 
Intake, Oral  120 240
 
Patient   169 lb
 
Weight   
 
 
 
 
Physical Exam
General Appearance: Alert, Oriented X3, Cooperative, No Acute Distress
Cardiovascular: Regular Rate, Normal S1, Normal S2, No Murmurs
Lungs: left lung base wheezing
Abdomen: Normal Bowel Sounds, Soft, No Tenderness
Neurological: Normal Speech, Strength at 5/5 X4 Ext, Normal Tone, Sensation 
Intact
Extremities: No Edema
Current Medications:
 Current Medications
 
 
  Sig/No Start time  Last
 
Medication Dose Route Stop Time Status Admin
 
Albuterol Sulfate 3 ML TID  0900 AC 
 
  INH   1321
 
Aspirin Buffered 81 MG DAILY  0900 AC 
 
  PO   0919
 
Atorvastatin Calcium 5 MG 1700  1800 AC 
 
  PO   1702
 
Azithromycin 500 MG DAILY  0900 AC 
 
  PO   0925
 
Azithromycin 500 MG DAILY@1530  1530 DC 
 
Sodium Chloride 250 ML IV   1406
 
Ceftriaxone Sodium 1,000 MG DAILY@1515  1515 DC 
 
  IV   1406
 
Docusate Sodium 100 MG BID  1100 AC 
 
  PO   
 
Enoxaparin Sodium 40 MG DAILY  1814 AC 
 
  SC   0919
 
Escitalopram Oxalate 10 MG AT BEDTIME  2100 AC 
 
  PO   2021
 
Guaifenesin/ 10 ML Q6P PRN  0845 AC 
 
Dextromethorphan  PO   
 
Hydrochlorothiazide 12.5 MG DAILY  0900 AC 
 
  PO   0913
 
Levothyroxine Sodium 0.1 MG DAILY AC  0700 AC 
 
  PO   0458
 
Losartan Potassium 25 MG DAILY  09 AC 
 
  PO   0919
 
Methylprednisolone 40 MG Q12  09 DC 
 
  IV   202
 
Multivitamins  1 TAB DAILY  09 AC 
 
Therapeutic  PO   08
 
Omeprazole 40 MG DAILY AC  0832 AC 
 
  PO   09
 
Pantoprazole Sodium 40 MG DAILY  1945 DC 
 
  IV   0827
 
Patient Medication  1 ED ONE ONE  1715 DC 
 
Teaching  ED  1716  
 
Prednisone 40 MG DAILY  0900 AC 
 
  PO   09
 
Senna 187 MG AT BEDTIME  2100 AC 
 
  PO   
 
 
 
 
Assessment/Plan
Assessment:
87-year-old female with a past medical history of hypertension, hyperlipidemia, 
COPD, GERD, hypothyroidism, and IBS presenting for shortness breath.
 
#Shortness of breath secondary to bronchitis/COPD
Patient received ceftriaxone and azithromycin.  We will discontinue the same.  
We will start her on azithromycin 500 daily.  We will change IV Solu-Medrol to 
by mouth prednisone.  We will add Robitussin cough syrup.  Continue TRC 
nebulization.
-Patient was seen by speech and swallow eval yesterday who recommended 
mechanical soft diet but the patient refused.  Hence she was put on regular diet
and explained the risk of aspiration.  They also offered barium swallow test but
the patient refused yesterday.  Today after talking to the son and patient, 
about the importance of daily and swallow to rule out any risk of aspiration, 
they both Agreed upon going for barium swallow today.  We will check 
echocardiogram to rule out any other causes of shortness of breath.  IV Protonix
changed to by mouth omeprazole.
 
#HTN
-cont losartan, hydro-chlorothiazide,
 
#Chronic medical problems
-Continue  aspirin, levothyroxine, escitalopram, Protonix, atorvastatin
 
#FULL CODE
#DVT prophylaxisLovenox
#Physical therapy to help with discharge disposition.
Problem List:
 1. Bronchitis
 
Pain Ratin
Pain Location:
none
Pain Goal: Remain pain free
Pain Plan:
tylenol
Tomorrow's Labs & Rationales:
none

## 2018-04-25 VITALS — DIASTOLIC BLOOD PRESSURE: 80 MMHG | SYSTOLIC BLOOD PRESSURE: 146 MMHG

## 2018-04-25 VITALS — SYSTOLIC BLOOD PRESSURE: 146 MMHG | DIASTOLIC BLOOD PRESSURE: 80 MMHG

## 2018-04-25 NOTE — DISCHARGE SUMMARY
Visit Information
 
Visit Dates
Admission Date:
04/22/18
 
Discharge Date:
04/25/18
 
 
Hospital Course
 
Course
Attending Physician:
Chantel Parrish MD
 
Primary Care Physician:
Wellington White MD
 
Hospital Course:
87-year-old female with a past medical history of hypertension, hyperlipidemia, 
COPD, GERD, hypothyroidism, and IBS presented for shortness breath.  The patient
's son states that the symptoms started several weeks ago when the patient had 
signs of a upper respiratory infection.  The son stated that the patient went to
the PCP on the april 13th and was given a Z-Valente and pro air.  He stated that the
symptoms occurred 3-4 days after PCP visit.  He states that she got a flu test 
which was negative and she was diagnosed with a cold.  The son stated that the 
symptoms started a day prior to admission when the patient was started on a 
trial of Lanzess for constipation.  He stated that the patient had several bowel
movements.  He stated that after taking the medication the patient began having 
symptoms of GERD which then led to an acute reemergence of her shortness of 
breath.  The shortness of breath is associated with cough.  The patient denies 
any fevers or chills.
 
Hospital course
#Shortness of breath secondary to bronchitis/COPD
Patient received ceftriaxone and azithromycin initially which was discontinued 
in view of bronchitis/COPD.  Patient continued on by mouth steroids and 
azithromycin. Continued TRC nebulization.  Patient advised to follow-up with her
primary care physician and also follow up her echo results.  She was given 
referral to Dr. Varela.  Patient was sent home with nebulizer and albuterol 
solution.
 
 
#HTN
-Continued losartan, hydro-chlorothiazide,
 
#Chronic medical problems
- aspirin, levothyroxine, escitalopram, Protonix, atorvastatin
 
#Difficulty swallowing
Patient was seen by speech and swallow eval ordered a modified barium swallow 
which showed silent penetration of contrast with mild pooling of contrast in the
Valleculae.  Advised to continue regular diet with Advise for  small bites/slips
, slow rate, alternating solids/liquids, Pred position.  Not talking while 
eating/drinking, reducing environmental distraction.
 
 
 
 
 
 
Follow-up with echocardiogram
Nebulizer sent along with albuterol solution
Complications:
None
Allergies:
Coded Allergies:
linaclotide (From LINZESS) (Severe, WHEEZING, SOB 04/22/18)
tiotropium (UNKNOWN 04/22/18)
 
 
Disposition Summary
 
Disposition
Principal Diagnosis:
Bronchitis/COPD
Additional Diagnosis:
none
Discharge Disposition: home health services
 
Discharge Instructions
 
General Discharge Information
Code Status: Full Code
Patient's Diet:
Regular diet
Patient's Activity:
As tolerated
Follow-Up Instructions/Appts:
These follow-up with your primary care provider and pulmonologist within 1-2 
weeks of discharge
 
Medications at Discharge
Discharge Medications:
Continue taking these medications:
Albuterol Sulfate (Proair Hfa) 90 MCG HFA.AER.AD
    2 Puff Inhale through mouth EVERY 4-6 HOURS AS NEEDED as needed for RESP.
    Qty = 8
 
Lovastatin (Lovastatin) 20 MG TABLET
    1 Tablet ORAL DAILY
    Qty = 90
    Comments:
       LAST TAKEN:  4/24/18 @ 5 PM
 
Levothyroxine Sodium (Levothyroxine Sodium) 100 MCG TABLET
    1 Tablet ORAL DAILY
    Qty = 90
    Comments:
       LAST TAKEN:  4/25/18 @ 6 AM
 
Losartan Potassium (Losartan Potassium) 25 MG TABLET
    1 Tablet ORAL DAILY
    Qty = 90
    Comments:
       LAST TAKEN:  4/25/18 @ 9 AM
 
Escitalopram Oxalate (Escitalopram Oxalate) 10 MG TABLET
    1 Tablet ORAL DAILY
    Qty = 90
    Comments:
       LAST TAKEN:  4/24/18 @ 10 PM
 
Hydrochlorothiazide (Hydrochlorothiazide) 12.5 MG CAPSULE
    1 Capsule ORAL DAILY
    Qty = 90
    Comments:
       LAST TAKEN:  4/25/18 @ 9 AM
 
Aspirin (Ecotrin*) 81 MG TABLET.DR
    1 Tablet ORAL DAILY
    Comments:
       LAST TAKEN:  4/25/18 @ 9AM
 
Multiple Vitamin (Multivitamins) 1 EACH TABLET
    1 Tablet ORAL DAILY
    Comments:
       LAST TAKEN:  4/25/18 @ 9 AM
 
Start taking the following new medications:
Prednisone (Prednisone) 10 MG TABLET
    1 Tablet ORAL DAILY
    Qty = 12
    No Refills
    Instructions:
       take 3 tab from 4/26-4/27
       Take 2 tab from 4/29-4/29
       Take 1 tab from 4/30-5/1
       .
    Comments:
       take 3 tabs-4/26-4/27
       Take 2 tab- 4/28-4/29
       Take 1 tab- 4/30-5/1
        
       LAST TAKEN:  4/25/18 @ 9 AM
 
Azithromycin (Azithromycin) 500 MG TABLET
    500 Milligram ORAL DAILY
    Qty = 1
    No Refills
    Instructions:
       .
    Comments:
       LAST TAKEN:  4/25/18 @ 9 AM
 
Albuterol Sulfate (Albuterol Sulfate) 2.5 MG/3 ML (0.083 %) VIAL.NEB
    1 [place] Inhale Solution EVERY SIX HOURS as needed for BRONCHITIS/COPD
    Qty = 1
    Refills = 1
    Instructions:
       .
    Comments:
       ICD CODE 190
 
 
Copies To:
Christopher CHIU,Wellington CHEUNG

## 2018-04-25 NOTE — PN- HOUSESTAFF
Subjective
Follow-up For:
BRONCHITIS
Complaints: no complaints
Subjective:
No overnight events.  Patient slept well.  Denies cough, chest pain, shortness 
of breath.
 
Review of Systems
Constitutional:
Reports: no symptoms, see HPI. 
 
Objective
Last 24 Hrs of Vital Signs/I&O
 Vital Signs
 
 
Date Time Temp Pulse Resp B/P B/P Pulse O2 O2 Flow FiO2
 
     Mean Ox Delivery Rate 
 
 0948 98.5 82 20 146/80     
 
 0947      93 Room Air  
 
 0625 98.5 82 20 146/80  92 Room Air  
 
 0000      96 Room Air  
 
 2149 98.9 79 20 160/80  95 Room Air  
 
 1822      95 Room Air  
 
 
 Intake & Output
 
 
  1600  0800  0000
 
Intake Total  240 300
 
Output Total   250
 
Balance  240 50
 
    
 
Intake, Oral  240 300
 
Output, Urine   250
 
Patient  170 lb 
 
Weight   
 
Weight  Bed scale 
 
Measurement   
 
Method   
 
 
 
 
Physical Exam
General Appearance: Alert, Oriented X3, Cooperative, No Acute Distress
Cardiovascular: Regular Rate, Normal S1, Normal S2, No Murmurs
Lungs: Clear to Auscultation
Abdomen: Normal Bowel Sounds, Soft, No Tenderness, No Hepatospenomegaly
Neurological: Strength at 5/5 X4 Ext, Normal Tone, Sensation Intact
Extremities: No Edema
Current Medications:
 Current Medications
 
 
  Sig/No Start time  Last
 
Medication Dose Route Stop Time Status Admin
 
Albuterol Sulfate 3 ML  DCD 
 
  INH   0944
 
Aspirin Buffered 81 MG DAILY 900 DCD 
 
  PO   0948
 
Atorvastatin Calcium 5 MG 1700  1800 DCD 
 
  PO   1638
 
Azithromycin 500 MG DAILY  09 DCD 
 
  PO   0947
 
Docusate Sodium 100 MG BID  1100 DCD 
 
  PO   0948
 
Enoxaparin Sodium 40 MG DAILY  1814 DCD 
 
  SC   0948
 
Escitalopram Oxalate 10 MG AT BEDTIME  2100 DCD 
 
  PO   2009
 
Guaifenesin/ 10 ML Q6P PRN  0845 DCD 
 
Dextromethorphan  PO   
 
Hydrochlorothiazide 12.5 MG DAILY 900 DCD 
 
  PO   0947
 
Levothyroxine Sodium 0.1 MG DAILY AC  07 DCD 
 
  PO   0631
 
Losartan Potassium 25 MG DAILY  0900 DCD 
 
  PO   0948
 
Multivitamins  1 TAB DAILY  0900 DCD 
 
Therapeutic  PO   0949
 
Omeprazole 40 MG DAILY AC  0832 DCD 
 
  PO   0631
 
Patient Medication  1 ED ONE ONE  1100 DC 
 
Teaching  ED  1101  
 
Polyethylene Glycol 17 GM DAILY  1415 DCD 
 
  PO   0948
 
Prednisone 40 MG DAILY  0900 DCD 
 
  PO   0947
 
Senna 187 MG AT BEDTIME  2100 DCD 
 
  PO   2255
 
 
 
 
Assessment/Plan
Assessment:
87-year-old female with a past medical history of hypertension, hyperlipidemia, 
COPD, GERD, hypothyroidism, and IBS presenting for shortness breath.
 
#Shortness of breath secondary to bronchitis/COPD
Patient received ceftriaxone and azithromycin.  This was discontinued in view of
bronchitis/COPD.  Patient continued on by mouth steroids and azithromycin. 
Continue TRC nebulization.  Patient advised to follow-up with her primary care 
physician and also follow up her echo results.
 
 
#HTN
-cont losartan, hydro-chlorothiazide,
 
#Chronic medical problems
-Continue  aspirin, levothyroxine, escitalopram, Protonix, atorvastatin
 
#FULL CODE
#DVT prophylaxisLovenox
#Physical therapy-suggested home physical therapy.  Patient sent home with by 
mouth steroids, azithromycin, nebulizer with albuterol solution.
 
Problem List:
 1. Bronchitis
 
Pain Ratin
Pain Location:
none
Pain Goal: Remain pain free
Pain Plan:
tylenol
Tomorrow's Labs & Rationales:
none

## 2018-04-25 NOTE — PATIENT DISCHARGE INSTRUCTIONS
Discharge Instructions
 
General Discharge Information
You were seen/treated for:
Bronchitis
Watch for these problems:
In case of cough, chest pain, shortness of breath, fever please go to the 
nearest emergency room
Special Instructions:
Please follow-up with your primary care provider and pulmonologist within 1-2 
weeks of discharge
 
Diet
Continue normal diet: No
Recommended Diet: Heart Healthy
 
Activity
Full Activity/No Limits: No
Activity Self Limited: Yes
 
Acute Coronary Syndrome
 
Inclusion Criteria
At DC or during hospital stay patient has or had the following:
ACS DIAGNOSIS No
 
Discharge Core Measures
Meds if any: Prescribed or Continued at Discharge
Meds if any: NOT Prescribed or Continued at Discharge
 
Congestive Heart Failure
 
Inclusion Criteria
At DC or during hospital stay patient has or had the following:
CHF DIAGNOSIS No
 
Discharge Core Measures
Meds if any: Prescribed or Continued at Discharge
Meds if any: NOT Prescribed or Continued at Discharge
 
Cerebrovascular accident
 
Inclusion Criteria
At DC or during hospital stay patient has or had the following:
CVA/TIA Diagnosis No
 
Discharge Core Measures
Meds if any: Prescribed or Continued at Discharge
Meds if any: NOT Prescribed or Continued at Discharge
 
Venous thromboembolism
 
Inclusion Criteria
VTE Diagnosis No
VTE Type NONE
VTE Confirmed by (Test) NONE
 
Discharge Core Measures
- Per Current guidelines, there needs to be overlap
- treatment for the first 5 days of Warfarin therapy.
- If discharged on Warfarin prior to 5 days of
- overlap therapy, the patient will need to be
- assessed for post discharge needs including
- *Post discharge parental anticoagulation
- *Warfarin and/or parental anticoagulation education
- *Follow up date to check INR post discharge
At least 5 days overlap therapy as Inpatient No
Meds if any: Prescribed or Continued at Discharge
Note: Overlap Therapy is Warfarin and Anticoagulant
Meds if any: NOT Prescribed or Continued at Discharge

## 2018-04-25 NOTE — PN- ATT ADDEND
Attending Addendum
Attending Brief Note
Patient seen and examined, overall doing okay.  Patient passed swallow 
evaluation per MBS.  Echo was done last night and results are pending.  She 
denies any shortness of breath but continues to claim that she feels that her 
neck muscles get tight likely secondary to her arthritis.
 
vss.
on exam
On lung exam: she has decreased bs overall. 
 
no labs today. 
 
A/P; 88 y/o F with pmh sig for hypertension, hyperlipidemia, COPD, GERD, 
hypothyroidism, and IBS admitted with shortness of breath, less likely community
-acquired pneumonia. Andreas has acute bronchitis as well as acute COPD 
exacerbation. 
 
Patient has been switched to oral prednisone.  She is already on oral 
azithromycin.  We will give her the prescription for the nebulizer.  Otherwise 
she is medically stable for discharge.  Echo results can be followed as olivier out 
patient. 
Will refer to Pulm as outpatient.

## 2018-04-26 NOTE — ECHOCARDIOGRAM REPORT
LUCAS DOOLEY 
 
 Age:    87     :    1930      Gender:     F 
 
 MRN:    [id number] 
 
 Exam Date:     2018  
                20:14 
 
 Exam Location: [address] [place] [address] A 
 
 Ht (in):     61      Wt (lb):      171     BSA:    1.86 
 
 BP:          136     /     72 
 
 Ordering Physician:        Wilton Rojas MD 
 
 Referring Physician:       Wilton Rojas MD 
 
 Technologist:              [place], Jean-Claude  
                            [facility] 
 
 Room Number:               223-1 
 
 Indications:       HEART FAILURE 
 
 Rhythm:                 Sinus 
 
 Technical Quality:      Fair, Technically difficult  
                         study 
 
 FINDINGS 
 
 Left Ventricle 
 Normal size left ventricle. No obvious regional wall motion  
 abnormalities. Normal left ventricular ejection fraction estimated  
 at 55-60%. 
 
 Right Ventricle 
 Right ventricle not well visualized, grossly normal. 
 
 Right Atrium 
 Normal right atrial size. 
 
 Left Atrium 
 Mild left atrial dilatation. 
 
 Mitral Valve 
 Moderate thickening/calcification of the anterior mitral valve  
 leaflet. Mitral annular calcification. Trace mitral regurgitation. 
 
 Aortic Valve 
 Trileaflet aortic valve. Diffuse thickening (sclerosis) of the  
 aortic valve cusps without reduced excursion. No aortic stenosis. No  
 aortic regurgitation. 
 
 Tricuspid Valve 
 Tricuspid valve not well visualized, grossly normal. Trace to mild  
 tricuspid regurgitation. 
 
 Pulmonic Valve 
 Pulmonic valve not well visualized. 
 
 Pericardium 
 No pericardial effusion. 
 
 Great Vessels 
 Aortic root and proximal ascending aorta not well visualized,  
 grossly normal. 
 
 CONCLUSIONS 
 1.  This was a technically difficult study 
 2.  Moderate aortic sclerosis is present with no valvular stenosis  
 or insufficiency. 
 3.  Moderate thickening and calcification of the mitral leaflets is  
 present with annular calcification and minimal mitral insufficiency  
 with mild left atrial enlargement. 
 4.  There is no significant pericardial fluid detected 
 5.  the left ventricular chamber size is normal with a normal  
 ejection fraction and no resting wall motion abnormalities. 
 6.  Minimal to mild tricuspid insufficiency is present with no  
 evidence of pulmonary hypertension. 
 7.  There is evidence of mild left ventricular diastolic dysfunction 
 
 AGUSTIN Lara M.D. 
 (Electronically Signed) 
 Final Date:      2018  
                  20:04 
 
 MEASUREMENTS  (Male / Female) Normal Values 
 
 2D ECHO 
 LV Diastolic Diameter PLAX        4.5 cm                4.2 - 5.9 / 3.9 - 5.3 
cm 
 LV Systolic Diameter PLAX         2.7 cm                2.1 - 4.0 cm 
 LV Fractional Shortening PLAX     40.0 %                25 - 46  % 
 LV Ejection Fraction 2D Teich     70.8 %                 
 IVS Diastolic Thickness           1.1 cm                 
 LVPW Diastolic Thickness          1.1 cm                 
 LV Relative Wall Thickness        0.5                    
 RV Internal Dim ED PLAX           2.4 cm                1.9 - 3.8 cm 
 LVOT Diameter                     1.9 cm                 
 Aortic Root Diameter              3.3 cm                 
 LA Systolic Diameter LX           4.0 cm                3.0 - 4.0 / 2.7 - 3.8 
cm 
 LA Volume                         59.0 cm              18 - 58 / 22 - 52 cm 
 Ascending Aorta Diameter          3.1 cm                 
 
 DOPPLER 
 AV Peak Velocity                  169.0 cm/s             
 AV Peak Gradient                  11.4 mmHg              
 AV Mean Velocity                  117.0 cm/s             
 AV Mean Gradient                  6.0 mmHg               
 AV Velocity Time Integral         33.7 cm                
 LVOT Peak Velocity                109.0 cm/s             
 LVOT Peak Gradient                4.8 mmHg               
 LVOT Mean Velocity                73.9 cm/s              
 LVOT Mean Gradient                3.0 mmHg               
 LVOT Velocity Time Integral       24.0 cm                
 LVOT Stroke Volume                68.0 cm               
 AV Area Cont Eq vti               2.0 cm                
 AV Area Cont Eq pk                1.8 cm                
 MV Peak Velocity                  111.0 cm/s             
 MV Peak Gradient                  4.9 mmHg               
 MV Mean Velocity                  83.0 cm/s              
 MV Mean Gradient                  3.0 mmHg               
 Mitral E Point Velocity           58.4 cm/s              
 Mitral A Point Velocity           94.0 cm/s              
 Mitral E to A Ratio               0.6                    
 MV PHT Velocity                   88.4 cm/s              
 MV Deceleration [place]             526.5 cm/s            
 MV Pressure Half Time             50.4 ms                
 MV Area PHT                       4.4 cm                
 MV Deceleration Time              183.0 ms               
 TV Peak Velocity                  276.0 cm/s             
 TV Peak E Velocity                52.9 cm/s              
 TV Peak A Velocity                50.3 cm/s              
 TV E to A Ratio                   1.1                    
 Right Atrial Pressure             5.0 mmHg               
 PV Peak Velocity                  86.9 cm/s              
 PV Peak Gradient                  3.0 mmHg               
 PV Mean Velocity                  65.3 cm/s              
 PV Mean Gradient                  2.0 mmHg               
 PV Velocity Time Integral         17.1 cm                
 LV E' Lateral Velocity            9.0 cm/s               
 Mitral E to LV E' Lateral Ratio   6.5                    
 LV E' Septal Velocity             5.0 cm/s               
 Mitral E to LV E' Septal Ratio    11.8

## 2018-05-01 ENCOUNTER — HOSPITAL ENCOUNTER (EMERGENCY)
Dept: HOSPITAL 68 - ERH | Age: 83
End: 2018-05-01
Payer: COMMERCIAL

## 2018-05-01 VITALS — HEIGHT: 61 IN | BODY MASS INDEX: 32.1 KG/M2 | WEIGHT: 170 LBS

## 2018-05-01 VITALS — DIASTOLIC BLOOD PRESSURE: 78 MMHG | SYSTOLIC BLOOD PRESSURE: 168 MMHG

## 2018-05-01 DIAGNOSIS — J44.1: Primary | ICD-10-CM

## 2018-05-01 LAB
ABSOLUTE GRANULOCYTE CT: 5.7 /CUMM (ref 1.4–6.5)
BASOPHILS # BLD: 0 /CUMM (ref 0–0.2)
BASOPHILS NFR BLD: 0.3 % (ref 0–2)
EOSINOPHIL # BLD: 0.4 /CUMM (ref 0–0.7)
EOSINOPHIL NFR BLD: 4.1 % (ref 0–5)
ERYTHROCYTE [DISTWIDTH] IN BLOOD BY AUTOMATED COUNT: 13.9 % (ref 11.5–14.5)
GRANULOCYTES NFR BLD: 59.3 % (ref 42.2–75.2)
HCT VFR BLD CALC: 44.4 % (ref 37–47)
LYMPHOCYTES # BLD: 2.6 /CUMM (ref 1.2–3.4)
MCH RBC QN AUTO: 32.1 PG (ref 27–31)
MCHC RBC AUTO-ENTMCNC: 33 G/DL (ref 33–37)
MCV RBC AUTO: 97.3 FL (ref 81–99)
MONOCYTES # BLD: 0.9 /CUMM (ref 0.1–0.6)
PLATELET # BLD: 266 /CUMM (ref 130–400)
PMV BLD AUTO: 9.2 FL (ref 7.4–10.4)
RED BLOOD CELL CT: 4.56 /CUMM (ref 4.2–5.4)
WBC # BLD AUTO: 9.5 /CUMM (ref 4.8–10.8)

## 2018-05-01 NOTE — ED DYSPNEA/ASTHMA COMPLAINT
History of Present Illness
 
General
Chief Complaint: Dyspnea (COPD, CHF, Other)
Stated Complaint: BIBA, SOB
Source: patient, old records, EMS
Exam Limitations: no limitations
 
Vital Signs & Intake/Output
Vital Signs & Intake/Output
 Vital Signs
 
 
Date Time Temp Pulse Resp B/P B/P Pulse O2 O2 Flow FiO2
 
     Mean Ox Delivery Rate 
 
 1336      94   
 
 1233 98.6 91 22 168/78  94 Room Air  
 
 1024 97.5 93 22 170/77     
 
05 1007 97.5 93 22 170/77  92 Room Air Room Air 
 
 0926 96.8        
 
 0841 96.8        
 
 0841      93 Nasal 1.0L 
 
       Cannula  
 
 0830  84 18 208/88  100 Room Air Room Air 
 
 0656 96.8 76 24 194/88     
 
 0635  76 24 194/88  99 Nasal 1.0L 
 
       Cannula  
 
 0558   24   100 Nasal 3.0L 
 
       Cannula  
 
 0550      96 Nasal 3.5L 
 
       Cannula  
 
 0540    206/96     
 
 0538      95 Nasal 4.0L 
 
       Cannula  
 
 0533 96.8 86 30 239/109  93 Room Air  
 
 
 
Allergies
Coded Allergies:
linaclotide (From LINZESS) (Severe, WHEEZING, SOB 18)
tiotropium (UNKNOWN 18)
 
Triage Nurses Notes Reviewed? yes
HPI:
Patient was discharged from here last week after admission for bronchitis.  
Patient has COPD but is not on home oxygen.  Patient was feeling better after 
her admission however yesterday she began to feel shortness of breath and 
wheezing again.  Patient also feels a tightness sensation in her throat.  
Patient states she felt similar symptoms prior to her last admission.  Patient 
states that when she lays down she feels that she is being chocked.  Patient 
denies any chest pain or palpitations.  There is no nausea or vomiting.  There 
is no fevers or chills.  Patient additionally speak 2-3 word sentences upon 
presentation to the emergency room.
(Pat CHIU,Robbie CONTRERAS)
Reconcile Medications
Albuterol Sulfate (Proair Hfa) 90 MCG HFA.AER.AD   2 PUF INH Q4-6 PRN PRN RESP. 
(Reported)
Albuterol Sulfate 2.5 MG/3 ML (0.083 %) VIAL.NEB   1 GAIL INH/SOL Q6 PRN 
BRONCHITIS/COPD
     .
Aspirin (Ecotrin*) 81 MG TABLET.DR   1 TAB PO DAILY HEART/BLOOD  (Reported)
Escitalopram Oxalate 10 MG TABLET   1 TAB PO DAILY MENTAL HEALTH  (Reported)
Hydrochlorothiazide 12.5 MG CAPSULE   1 CAP PO DAILY DIURETIC/BP  (Reported)
Levothyroxine Sodium 100 MCG TABLET   1 TAB PO DAILY THYROID  (Reported)
Losartan Potassium 25 MG TABLET   1 TAB PO DAILY BP  (Reported)
Lovastatin 20 MG TABLET   1 TAB PO DAILY CHOLESTEROL  (Reported)
Multiple Vitamin (Multivitamins) 1 EACH TABLET   1 TAB PO DAILY SUPPLEMENT  (
Reported)
Prednisone (Deltasone) 20 MG TABLET   3 TAB PO DAILY WHEEZING/TROUBLE BREATHING
     BEGIN TOMORROW
 
(Nupur CHIU,Abhinav HO)
 
Past History
 
Travel History
Traveled to [place] past 21 day No
 
Medical History
Any Pertinent Medical History? see below for history
Neurological: NONE
EENT: CATARACTS R EYE
Cardiovascular: hypertension, hyperlipidemia
Respiratory: COPD
Gastrointestinal: GERD
Hepatic: NONE
Renal: urinary incontinence, UTI
Musculoskeletal: ARTHRITIS
Endocrine: HYPOTHYROID
Blood Disorders: NONE
Cancer(s): NONE
GYN/Reproductive: NONE
History of MRSA: No
History of VRE: No
History of CDIFF: No
Influenza Vaccine: 10/01/14
 
Surgical History
Surgical History: non-contributory
 
Psychosocial History
Who do you live with Patient/Self
Services at Home None
What is your primary language English
Tobacco Use: Quit >30 days ago
ETOH Use: denies use
Illicit Drug Use: denies illicit drug use
 
Family History
Family History, If Any:
MOTHER (CVA and DM).
FATHER (CHF, renal stones and gall stones).
SON (DM and MI at 48 years).
 
Hx Contributory? No
(Pat CHIU,Robbie CONTRERAS)
 
Review of Systems
 
Review of Systems
Constitutional:
Reports: no symptoms. 
EENTM:
Reports: no symptoms. 
Respiratory:
Reports: see HPI, orthopnea (?), short of breath, wheezing. 
Cardiovascular:
Reports: no symptoms. 
GI:
Reports: no symptoms. 
Genitourinary:
Reports: no symptoms. 
Musculoskeletal:
Reports: no symptoms. 
Skin:
Reports: no symptoms. 
Neurological/Psychological:
Reports: no symptoms. 
Hematologic/Endocrine:
Reports: no symptoms. 
Immunologic/Allergic:
Reports: no symptoms. 
All Other Systems: Reviewed and Negative
(Robbie Stewart MD)
 
Physical Exam
 
Physical Exam
General Appearance: well developed/nourished, alert, awake, anxious, moderate 
distress
Head: atraumatic, normal appearance
Eyes:
Bilateral: PERRL, EOMI. 
Ears, Nose, Throat: normal pharynx, normal ENT inspection, hearing grossly 
normal
Neck: normal inspection, supple, full range of motion
Respiratory: decreased breath sounds, wheezing, respiratory distress
Cardiovascular: regular rate/rhythm, normal peripheral pulses
Gastrointestinal: normal bowel sounds, soft, non-tender, no organomegaly
Extremities: normal inspection, normal capillary refill, normal range of motion,
no edema
Neurologic/Psych: no motor/sensory deficits, awake, alert, oriented x 3, normal 
mood/affect
Lymphatic: no anterior cervical margarito
 
Core Measures
ACS in differential dx? No
CVA/TIA Diagnosis No
Sepsis Present: No
Sepsis Focused Exam Completed? No
(Pat CHIU,Robbie CONTRERAS)
 
Progress
Differential Diagnosis: asthma, bronchitis, COPD, pulmonary embolism, pneumonia,
pneumothorax
Plan of Care:
 Orders
 
 
Procedure Date/time Status
 
Heart Healthy Diet  L Active
 
AEROSOL (GEN)  0546 Complete
 
LACTIC ACID  0542 Complete
 
Telemetry/Cardiac Monitor  0541 Active
 
TROPONIN LEVEL  0541 Complete
 
COMPREHENSIVE METABOLIC PANEL  0541 Complete
 
CBC WITHOUT DIFFERENTIAL  0541 Complete
 
B-TYPE NATRIURETIC PEP (BNP)  0541 Complete
 
EKG  0536 Active
 
 
 Current Medications
 
 
  Sig/No Start time  Last
 
Medication Dose  Stop Time Status Admin
 
Aspirin Buffered 81 MG DAILY  1014 UNVr 
 
(Ecotrin)     1024
 
 
 Laboratory Tests
 
 
 
18 0842:
Lactic Acid Cancelled
 
18 0617:
Lactic Acid 0.5  L
 
18 0617:
Anion Gap 6, Estimated GFR > 60, BUN/Creatinine Ratio 38.0  H, Glucose 107  H, 
Calcium 8.9, Total Bilirubin 0.8, AST 24, ALT 41, Alkaline Phosphatase 46, 
Troponin I < 0.01, Pro-B-Natriuretic Pept 293  H, Total Protein 6.5, Albumin 3.6
, Globulin 2.9, Albumin/Globulin Ratio 1.2
 
18 0542:
CBC w Diff NO MAN DIFF REQ, RBC 4.56, MCV 97.3, MCH 32.1  H, MCHC 33.0, RDW 13.9
, MPV 9.2, Gran % 59.3, Lymphocytes % 26.9, Monocytes % 9.4  H, Eosinophils % 
4.1, Basophils % 0.3, Absolute Granulocytes 5.7, Absolute Lymphocytes 2.6, 
Absolute Monocytes 0.9  H, Absolute Eosinophils 0.4, Absolute Basophils 0
 
Diagnostic Imaging:
Viewed by Me: Radiology Read.  Discussed w/RAD: Radiology Read. 
Initial ED EKG: SR, FIRST DEGREE HB, LATE TRANSITION, NO CHANGE FROM PRIOR
Prior EKG: unchanged
Rhythm Strip: normal sinus rhythm
Hand-Off
   Endorsed To:
Abhinav Espitia MD
   Endorsed Time: 0700
   Pending: labs
Comments:
Patient is feeling much improved after the DuoNeb.  Patient is now able to speak
complete sentences.
(Pat CHIU,Robbie CONTRERAS)
CXR Impression: PATIENT: GISSELL DOOLEY  MEDICAL RECORD NO: [id number] PRESENT AGE: 
87  PATIENT ACCOUNT NO: [id number] : 30  LOCATION: [facility] ORDERING PHYSICIAN:
Robbie Stewart MD     SERVICE DATE:  EXAM TYPE: RAD - XRY-
PORTABLE CHEST XRAY EXAMINATION: XR PORTABLE CHEST CLINICAL INFORMATION: 
Shortness of breath COMPARISON: 2018 TECHNIQUE: Portable frontal view of 
the chest was obtained. FINDINGS: The lungs are hyperinflated, suggesting 
underlying COPD. No focal consolidation is seen. No appreciable pneumothorax, 
though the right lung apex is partially obscured due to patient positioning. No 
evidence of pulmonary edema or significant pleural effusion. The 
cardiomediastinal contour is unremarkable. No acute osseous findings are seen. 
IMPRESSION: Hyperinflated lungs suggesting COPD, without additional acute 
findings. DICTATED BY: Aris Finney MD  DATE/TIME DICTATED:18 
:RAD.CONCEPCION  DATE/TIME TRANSCRIBED:18 CONFIDENTIAL, 
DO NOT COPY WITHOUT APPROPRIATE AUTHORIZATION.  <Electronically signed in Other 
Vendor System>                                                                  
                     SIGNED BY: Aris Finney MD 18 0641
Comments:
2018 7:26:50 AM patient signed out to me by Dr. Stewart at shift change 
over.
 
2018 8:25:37 AM Gissell is feeling better but her "breathing" is not back to
baseline.  She is currently on less then 1 L O2 via nasal cannula and oxygen 
saturation is 95%.  02 is being weaned.  I ordered an additional albuterol 
nebulizer and ibuprofen for her neck stiffness secondary to arthritis.  
Disposition pending.
 
2018 9:27:44 AM patient is feeling better and feels she is approaching her 
baseline pulmonary status.  She is now currently on room air and once she 
equilibrates we will attempt ambulation.  I feel the patient is able to ambulate
at baseline she would be amenable to outpatient management.
 
2018 2:29:48 PM I have updated patient's son on test results and plan.  He 
will take Gissell home.
(Nupur CHIU,Abhinav [place])
 
Departure
 
Departure
Condition: Stable
Referrals:
Christopher CHIU,Wellington CHEUNG (PCP/Family)
 
Departure Forms:
Customer Survey
General Discharge Information
(Pat CHIU,Robbie CONTRERAS)
 
Departure
Disposition: HOME OR SELF CARE
Clinical Impression
Primary Impression: COPD exacerbation
Additional Instructions:
Rest, no exertion.  Take your nebulizer every 6 hours.  May use your inhaler in 
between nebulizer doses if necessary.  Prednisone as prescribed.  Follow-up with
your primary care physician or pulmonologist within 48 hours for reevaluation.  
Return if any concerns or sudden worsening.
 
Please note that there might be incidental findings in your evaluation that are 
unrelated to the current emergency department visit.  Please notify your primary
care doctor about this emergency department visit in order to obtain and review 
all of the testing performed so that these incidental findings can be monitored 
as needed.
 
If you had an x-ray performed, please understand that some fractures may not be 
seen on the initial set of x-rays.  If your symptoms persist you might need a 
repeat set of x-rays to check for such a fracture.
 
If you had a laceration evaluated, please understand that foreign bodies such as
glass or wood may not be visible to the naked eye or on plain x-rays.  If the 
wound becomes red, swollen, increasingly more painful or if there is any 
drainage from the wound, please have it reevaluated by a physician for the 
possibility of a retained foreign body.
 
*****If you're unable to follow up as outlined in the discharge instructions 
please return to the emergency department.******
 
Thank you for choosing the [facility] Emergency Department for your care.
It was a pleasure to serve you today.
 
Abhinav Espitia M.D.
[facility]
 
Prescriptions:
Current Visit Scripts
Prednisone (Deltasone) 3 TAB PO DAILY  
     #12 TAB 
     BEGIN TOMORROW
 
 
(Nupur CHIU,Abhinav HO)
 
Critical Care Note
 
Critical Care Note
Critical Care Time: non-applicable
(Pat CHIU,Robbie CONTRERAS)
 
Critical Care Note
Critical Care Time: 30-74 min
(Nupur CHIU,Abhinav HO)

## 2018-05-01 NOTE — RADIOLOGY REPORT
EXAMINATION:
XR PORTABLE CHEST
 
CLINICAL INFORMATION:
Shortness of breath
 
COMPARISON:
4/22/2018
 
TECHNIQUE:
Portable frontal view of the chest was obtained.
 
FINDINGS:
The lungs are hyperinflated, suggesting underlying COPD. No focal
consolidation is seen. No appreciable pneumothorax, though the right lung
apex is partially obscured due to patient positioning. No evidence of
pulmonary edema or significant pleural effusion.
 
The cardiomediastinal contour is unremarkable. No acute osseous findings are
seen.
 
IMPRESSION:
Hyperinflated lungs suggesting COPD, without additional acute findings.

## 2018-05-17 ENCOUNTER — HOSPITAL ENCOUNTER (INPATIENT)
Dept: HOSPITAL 68 - ERH | Age: 83
LOS: 4 days | Discharge: HOME HEALTH | DRG: 191 | End: 2018-05-21
Attending: INTERNAL MEDICINE | Admitting: INTERNAL MEDICINE
Payer: COMMERCIAL

## 2018-05-17 VITALS — WEIGHT: 173.25 LBS | HEIGHT: 64 IN | BODY MASS INDEX: 29.58 KG/M2

## 2018-05-17 VITALS — SYSTOLIC BLOOD PRESSURE: 120 MMHG | DIASTOLIC BLOOD PRESSURE: 80 MMHG

## 2018-05-17 VITALS — DIASTOLIC BLOOD PRESSURE: 80 MMHG | SYSTOLIC BLOOD PRESSURE: 130 MMHG

## 2018-05-17 DIAGNOSIS — I10: ICD-10-CM

## 2018-05-17 DIAGNOSIS — K58.9: ICD-10-CM

## 2018-05-17 DIAGNOSIS — E87.1: ICD-10-CM

## 2018-05-17 DIAGNOSIS — K21.9: ICD-10-CM

## 2018-05-17 DIAGNOSIS — E78.5: ICD-10-CM

## 2018-05-17 DIAGNOSIS — J44.1: Primary | ICD-10-CM

## 2018-05-17 DIAGNOSIS — Z88.8: ICD-10-CM

## 2018-05-17 DIAGNOSIS — E03.9: ICD-10-CM

## 2018-05-17 DIAGNOSIS — M79.89: ICD-10-CM

## 2018-05-17 DIAGNOSIS — J40: ICD-10-CM

## 2018-05-17 DIAGNOSIS — R32: ICD-10-CM

## 2018-05-17 LAB
ABSOLUTE GRANULOCYTE CT: 4.3 /CUMM (ref 1.4–6.5)
BASOPHILS # BLD: 0 /CUMM (ref 0–0.2)
BASOPHILS NFR BLD: 0.8 % (ref 0–2)
EOSINOPHIL # BLD: 0.1 /CUMM (ref 0–0.7)
EOSINOPHIL NFR BLD: 1.7 % (ref 0–5)
ERYTHROCYTE [DISTWIDTH] IN BLOOD BY AUTOMATED COUNT: 13.6 % (ref 11.5–14.5)
GRANULOCYTES NFR BLD: 78.4 % (ref 42.2–75.2)
HCT VFR BLD CALC: 41.3 % (ref 37–47)
LYMPHOCYTES # BLD: 0.7 /CUMM (ref 1.2–3.4)
MCH RBC QN AUTO: 32 PG (ref 27–31)
MCHC RBC AUTO-ENTMCNC: 33.2 G/DL (ref 33–37)
MCV RBC AUTO: 96.5 FL (ref 81–99)
MONOCYTES # BLD: 0.4 /CUMM (ref 0.1–0.6)
PLATELET # BLD: 187 /CUMM (ref 130–400)
PMV BLD AUTO: 9.6 FL (ref 7.4–10.4)
RED BLOOD CELL CT: 4.28 /CUMM (ref 4.2–5.4)
WBC # BLD AUTO: 5.4 /CUMM (ref 4.8–10.8)

## 2018-05-17 PROCEDURE — 2NASP: CPT

## 2018-05-17 NOTE — HISTORY & PHYSICAL
Rohith Ellis 05/17/18 1222:
General Information and HPI
MD Statement:
I have seen and personally examined LUCAS DOOLEY and documented this H&P.
 
Source of Information: patient, family
Exam Limitations: no limitations
History of Present Illness:
The patient is a 87 year old female with past medical history of hypertension, 
hyperlipidemia, COPD not on oxygen, GERD, hypothyroidism, and IBS presents to 
the hospital with CC of worsening dyspnea and productive cough for the past 2-3 
days.  
 
Per patient she has been having increased SOB and production of sputum(yellow) 
for the past 2-3 days. Denies having fever/chills, chest pain. She used her 
nebulizers before presentation but did not have any relief. She was admitted to 
the hospital last month for similar complaints and 2 times after that visited 
the ED. She has h/o sick contact(a resident at her assissted living had URI).  
No h/o PE or DVT. 
 
Per patient's son, she was seen by Dr. Lara approximately a week prior to her
presentation and was told that her shortness of breath is not cardiac related.  
She states that she has to sleep in a sitting position due to her shortness of 
breath.
 
Denies smoking, etoh or illicit drugs. 
 
Per patient, she usually has difficulty swallowing her pills and has to have to 
with applesauce or soft food.  Underwent swallow evaluation during her last 
admission in April which she passed for regular diet but with certain 
precautions.
 
 
Allergies/Medications
Allergies:
Coded Allergies:
linaclotide (From LINZESS) (Severe, WHEEZING, SOB 04/22/18)
tiotropium (UNKNOWN 04/22/18)
 
 
Past History
 
Travel History
Traveled to [place] past 21 day No
 
Medical History
Neurological: NONE
EENT: CATARACTS R EYE
Cardiovascular: hypertension, hyperlipidemia
Respiratory: COPD
Gastrointestinal: GERD
Hepatic: NONE
Renal: urinary incontinence, UTI
Musculoskeletal: ARTHRITIS
Endocrine: HYPOTHYROID
Blood Disorders: NONE
Cancer(s): NONE
GYN/Reproductive: NONE
History of MRSA: No
History of VRE: No
History of CDIFF: No
 
Surgical History
Surgical History: non-contributory
 
Past Family/Social History
 
Family History
Relations & Conditions if any
MOTHER (CVA and DM).
FATHER (CHF, renal stones and gall stones).
SON (DM and MI at 48 years).
 
 
Psychosocial History
Services at Home: None
 
Review of Systems
 
Review of Systems
Constitutional:
Denies: chills, diaphoresis, fever, malaise, weakness, unexplained weight loss. 
EENTM:
Reports: no symptoms. 
Cardiovascular:
Reports: edema, orthopena.  Denies: chest pain, palpitations, peripheral edema, 
syncope. 
Respiratory:
Reports: cough, orthopnea, short of breath, sputum production, wheezing.  Denies
: hemoptysis, stridor. 
GI:
Reports: no symptoms. 
Genitourinary:
Reports: no symptoms. 
Musculoskeletal:
Reports: no symptoms. 
Skin:
Reports: no symptoms. 
Neurological/Psychological:
Reports: no symptoms. 
Hematologic/Endocrine:
Reports: no symptoms. 
Immunologic/Allergic:
Reports: no symptoms. 
All Other Systems: Reviewed and Negative
 
Exam & Diagnostic Data
Last 24 Hrs of Vital Signs/I&O
 Vital Signs
 
 
Date Time Temp Pulse Resp B/P B/P Pulse O2 O2 Flow FiO2
 
     Mean Ox Delivery Rate 
 
05/17 1201 98.0 88 18 182/81  98 Nasal 2.0L 
 
       Cannula  
 
05/17 1052       Room Air Room Air 
 
05/17 1022      94   
 
05/17 0950 98.1 85 18 199/85  95 Room Air  
 
 
 Intake & Output
 
 
 05/17 1600 05/17 0800 05/17 0000
 
Intake Total   
 
Output Total   
 
Balance   
 
    
 
Patient 170 lb  
 
Weight   
 
Weight Reported by Patient  
 
Measurement   
 
Method   
 
 
 
 
Physical Exam
General Appearance Alert, Oriented X3, Cooperative, No Acute Distress
Skin No Rashes, No Breakdown
Skin Temp/Moisture Exam: Warm/Dry
HEENT Atraumatic, PERRLA, EOMI, Mucous Membr. moist/pink
Neck JVD
Lymphatic Cervical nl
Cardiovascular Regular Rate, Normal S1, Normal S2, No Murmurs, Gallops, Rubs
Lungs diminished breath sounds b/l
Abdomen Normal Bowel Sounds, Soft, No Tenderness, No Hepatospenomegaly, No 
Masses
Neurological Normal Speech, Strength at 5/5 X4 Ext, Normal Tone, Sensation 
Intact
Extremities b/l LE edema
Vascular Normal Pulses, Pulses Symmetrical
Last 24 Hrs of Labs/Raji:
 Laboratory Tests
 
05/17/18 1001:
Anion Gap 6, Estimated GFR > 60, BUN/Creatinine Ratio 28.3  H, Glucose 93, 
Calcium 8.8, Total Bilirubin 1.0, AST 31, ALT 36, Alkaline Phosphatase 57, 
Troponin I 0.01, Pro-B-Natriuretic Pept 655  H, Total Protein 6.6, Albumin 3.6, 
Globulin 3.0, Albumin/Globulin Ratio 1.2, CBC w Diff NO MAN DIFF REQ, RBC 4.28, 
MCV 96.5, MCH 32.0  H, MCHC 33.2, RDW 13.6, MPV 9.6, Gran % 78.4  H, Lymphocytes
% 12.0  L, Monocytes % 7.1, Eosinophils % 1.7, Basophils % 0.8, Absolute 
Granulocytes 4.3, Absolute Lymphocytes 0.7  L, Absolute Monocytes 0.4, Absolute 
Eosinophils 0.1, Absolute Basophils 0
 Microbiology
05/17 1010  BLOOD: Blood Culture - RECD
05/17 1001  BLOOD: Blood Culture - RECD
 
 
 
Assessment/Plan
Assessment:
The patient is a 87 year old female with past medical history of hypertension, 
hyperlipidemia, COPD not on oxygen, GERD, hypothyroidism, and IBS presents to 
the hospital with CC of worsening dyspnea and productive cough for the past 2-3 
days.  Her symptoms improved after receiving supplemental oxygen and received 
the ED.
 
Chest x-ray negative.
 
Problem list
#Dyspnea: Likely secondary to bronchitis/COPD exacerbation
#Hyponatremia
#Hypothyroidism
 
Plan





monitor blood pressure closely





 
As Ranked By This Provider
Problem List:
 1. COPD exacerbation
 
 2. Dyspnea
 
 
Core Measures/Misc (9/17)
 
Acute Coronary Syndrome
ACS Diagnosis: No
 
Congestive Heart Failure
Congestive Heart Failure Diagnosis No
 
Cerebrovascular Accident
CVA/TIA Diagnosis: No
 
VTE (View Protocol)
VTE Risk Factors Age>40
No Mechanical VTE Prophylaxis d/t N/A MechProphylax Ordered
No VTE Pharm Prophylaxis d/t NA PharmProphylax ordered
 
Sepsis (View protocol)
Sepsis Present: No
 
MisaelAlbertojerry 05/17/18 1508:
General Information and HPI
 
Allergies/Medications
Home Med list
Acetaminophen (Pain & Fever) 325 MG TABLET   1 TAB PO Q8P PRN PAIN  (Reported)
Aspirin (Ecotrin*) 81 MG TABLET.DR   1 TAB PO DAILY HEART/BLOOD  (Reported)
Budesonide/Formoterol Fumarate (Symbicort 160-4.5 Mcg Inhaler) 160 MCG-4.5 MCG/
ACTUATION HFA.AER.AD   2 PUF INH BID COPD
     .
Cholecalciferol (Vitamin D3) 1,000 UNIT TABLET   1 TAB PO DAILY VITAMIN SUPPORT 
(Reported)
Cyanocobalamin (Vitamin B-12) 1,000 MCG TABLET   1 TAB PO DAILY VITAMIN SUPPORT 
(Reported)
Escitalopram Oxalate 10 MG TABLET   1 TAB PO DAILY MENTAL HEALTH  (Reported)
Ferrous Sulfate 325 MG (65 MG IRON) TABLET   1 TAB PO TID IRON, VITAMIN  (
Reported)
Fluticasone Propionate 50 MCG/ACTUATION SPRAY.SUSP   2 SPRAY NASB DAILY 
ALLERGIES  (Reported)
Levothyroxine Sodium 100 MCG TABLET   1 TAB PO DAILY AC THYROID  (Reported)
Loperamide HCl (Loperamide) 2 MG CAPSULE   1 CAP PO Q6 PRN Diarrhea   (Reported)
Losartan Potassium 25 MG TABLET   1 TAB PO DAILY BP  (Reported)
Lovastatin 20 MG TABLET   1 TAB PO DAILY CHOLESTEROL  (Reported)
Magnesium Oxide 250 MG TABLET   1 TAB PO DAILY SUPPLEMENT  (Reported)
Multiple Vitamin (Multivitamins) 1 EACH TABLET   1 TAB PO DAILY SUPPLEMENT  (
Reported)
Naproxen (Naprosyn) 500 MG TABLET   1 TAB PO BID PRN PAIN  (Reported)
Polyethylene Glycol 3350 (Miralax) 17 GRAM POWD.PACK   1 PAC PO DAILY 
CONSTIPATION  (Reported)
     dissolve in water
Prednisone 10 MG TABLET   1 TAB PO SEE ADMIN CRITERIA Acute Bronchits 
     On 5/22-5/23 take 3
     5/24-5/25 take 2
     5/26-5/27 take 1.
Sennosides (Senna) 8.6 MG TABLET   1 TAB PO DAILY PRN CONSTIPATION  (Reported)
 
 
 
Attending MD Review Statement
 
Attending Statement
Attending MD Statement: examined this patient, discuss w/resident/PA/NP, agreed 
w/resident/PA/NP, reviewed EMR data (avail)
Attending Assessment/Plan:
87 yr old female with pmh of COPD not on home oxygen, hypertension, 
hyperlipidemia, GERD, hypothyroidism, and IBS who presented to the ER with c/c 
of sob for the past 2-3 days. Pt was recently seen in Dr Lara's office and 
was told she does not have CHF. 
Pt says she has hard time sleeping over the last few days. Pt was recently 
admitted to medicine for COPD exacerbation. Pt also was recently in the ED for 
similar complaints. 
 
Acute copd exacerbation- now feeling better after she got iv steroids in the ER 
. cont on nebs and iv steroids 40 q12h. 
 
B/l Leg swelling- will get d dimer level checked and if high will get doppler 
lower extremity. Her wells score is intermediate. 
 
d/w pt the care plan.

## 2018-05-17 NOTE — RADIOLOGY REPORT
EXAMINATION:
XR PORTABLE CHEST
 
CLINICAL INFORMATION:
Dyspnea. Rule out pneumonia.
 
COMPARISON:
Chest x-ray dated 05/01/2018, 4/22/2018 and 10/22/2010.
 
TECHNIQUE:
Portable AP semierect view of the chest was obtained.
 
FINDINGS:
The cardiomediastinal silhouette is enlarged, likely related to the AP
semiupright portable technique. Ectasia and tortuosity of the aorta is also
seen, unchanged.
 
Lungs bilaterally are symmetrically mildly hyperinflated and demonstrate
minimal bibasilar linear atelectatic changes, left greater than right. No
focal consolidation, effusion or pneumothorax is seen.
 
Bony structures are unremarkable.
 
IMPRESSION:
Findings suggestive of obstructive lung disease with mild bibasilar linear
subsegmental atelectasis. No focal pneumonia.

## 2018-05-17 NOTE — ED DYSPNEA/ASTHMA COMPLAINT
History of Present Illness
 
General
Chief Complaint: Dyspnea (COPD, CHF, Other)
Stated Complaint: BIBA DIFF BREATHING
Source: patient, old records, EMS
Exam Limitations: no limitations
 
Vital Signs & Intake/Output
Vital Signs & Intake/Output
 Vital Signs
 
 
Date Time Temp Pulse Resp B/P B/P Pulse O2 O2 Flow FiO2
 
     Mean Ox Delivery Rate 
 
 1432 98.4 96 20 120/80  97 Room Air  
 
 1352       Nasal 2.0L 
 
       Cannula  
 
 1336  87  176/81     
 
 1302 97.9 87 15 176/81  95 Nasal 2.0L 
 
       Cannula  
 
 1201 98.0 88 18 182/81  98 Nasal 2.0L 
 
       Cannula  
 
 1052       Room Air Room Air 
 
 1022      94   
 
 0950 98.1 85 18 199/85  95 Room Air  
 
 
 
Allergies
Coded Allergies:
linaclotide (From LINZESS) (Severe, WHEEZING, SOB 18)
tiotropium (UNKNOWN 18)
 
Reconcile Medications
Acetaminophen (Pain & Fever) 325 MG TABLET   1 TAB PO Q8P PRN PAIN  (Reported)
Aspirin (Ecotrin*) 81 MG TABLET.DR   1 TAB PO DAILY HEART/BLOOD  (Reported)
Cholecalciferol (Vitamin D3) 1,000 UNIT TABLET   1 TAB PO DAILY VITAMIN SUPPORT 
(Reported)
Cyanocobalamin (Vitamin B-12) 1,000 MCG TABLET   1 TAB PO DAILY VITAMIN SUPPORT 
(Reported)
Escitalopram Oxalate 10 MG TABLET   1 TAB PO DAILY MENTAL HEALTH  (Reported)
Ferrous Sulfate 325 MG (65 MG IRON) TABLET   1 TAB PO TID IRON, VITAMIN  (
Reported)
Fluticasone Propionate 50 MCG/ACTUATION SPRAY.SUSP   2 SPRAY NASB DAILY 
ALLERGIES  (Reported)
Hydrochlorothiazide 12.5 MG CAPSULE   1 CAP PO DAILY DIURETIC/BP  (Reported)
Levothyroxine Sodium 100 MCG TABLET   1 TAB PO DAILY AC THYROID  (Reported)
Loperamide HCl (Loperamide) 2 MG CAPSULE   1 CAP PO Q6 PRN CONSTIPATION  (
Reported)
Losartan Potassium 25 MG TABLET   1 TAB PO DAILY BP  (Reported)
Lovastatin 20 MG TABLET   1 TAB PO DAILY CHOLESTEROL  (Reported)
Magnesium Oxide 250 MG TABLET   1 TAB PO DAILY SUPPLEMENT  (Reported)
Multiple Vitamin (Multivitamins) 1 EACH TABLET   1 TAB PO DAILY SUPPLEMENT  (
Reported)
Naproxen (Naprosyn) 500 MG TABLET   1 TAB PO BID PRN PAIN  (Reported)
Polyethylene Glycol 3350 (Miralax) 17 GRAM POWD.PACK   1 PAC PO DAILY 
CONSTIPATION  (Reported)
     dissolve in water
Sennosides (Senna) 8.6 MG TABLET   1 TAB PO DAILY PRN CONSTIPATION  (Reported)
 
Triage Nurses Notes Reviewed? yes
Onset: Abrupt
Duration: day(s): (2-3), constant
Timing: recent history
Severity: moderate
Prior Episodes/Possible Cause: frequent episodes
Associated Symptoms: cough
HPI:
87 year old female with past medical history of hypertension, hyperlipidemia, 
COPD (not on home o2), GERD, hypothyroidism, and IBS presents to the ER for 
evaluation complaining of progressively worsening shortness of breath or 
productive cough white to yellow sputum going on for the past 2-3 days.  Patient
was admitted last month for COPD bronchitis exacerbation.  She denies any chest 
pain orthopnea or leg swelling abdominal pain nausea vomiting fever chills.  
Patient states that she lost power and was unable to give herself a nebulizer 
treatment however she's been using her inhalers without improvement. On ems 
arrival pts sat was 89%,  95% at this time. she was given a sublingual nitro en 
route. she denies any chest pain since symptoms began
(Mehdi Wright)
 
Past History
 
Travel History
Traveled to [place] past 21 day No
 
Medical History
Any Pertinent Medical History? see below for history
Neurological: NONE
EENT: CATARACTS R EYE
Cardiovascular: hypertension, hyperlipidemia
Respiratory: COPD
Gastrointestinal: GERD
Hepatic: NONE
Renal: urinary incontinence, UTI
Musculoskeletal: ARTHRITIS
Endocrine: HYPOTHYROID
Blood Disorders: NONE
Cancer(s): NONE
GYN/Reproductive: NONE
History of MRSA: No
History of VRE: No
History of CDIFF: No
Influenza Vaccine: 10/01/14
 
Surgical History
Surgical History: non-contributory
 
Psychosocial History
Who do you live with Patient/Self
Services at Home None
What is your primary language English
 
Family History
Family History, If Any:
MOTHER (CVA and DM).
FATHER (CHF, renal stones and gall stones).
SON (DM and MI at 48 years).
 
Hx Contributory? No
(Mehdi Wright)
 
Review of Systems
 
Review of Systems
Constitutional:
Reports: see HPI. 
Comments
Review of systems: See HPI, All other systems negative.
Constitutional, no chills no fever,(+)  malaise no weight loss
HEENT:  no sore throat (+) congestion, no ear pain
Cardiovascular: No chest pain , no palpitation
Skin:  no rashes, no change in skin
Respiratory: dyspnea cough no  sputum no  hemoptysis
GI: No nausea no vomiting, no diarrhea,
: No dysuria 
Muscle skeletal: No joint pain, no back pain, no neck pain,
Neurologic: , no headache
Heme/endocrine: No bruising
(Mehdi Wright)
 
Physical Exam
 
Physical Exam
General Appearance: well developed/nourished, awake
Respiratory: wheezing
Comments:
Well-developed well-nourished person in no acute distress
HEENT: Normal EENT exam; PERRL, EOMI. HEAD is atraumatic. moist mucous 
membranes.  
Neck: Supple, no lymphadenopathy, normal range of motion
Back: Nontender, no CVA tenderness. Full range of motion
Cardiovascular: Regular rate and rhythms no murmurs rubs or gallops, normal JVP
Respiratory: MILD TO MODERATE respiratory distress.  Patient speaking in 3-4 
word sentences, diminished lung sounds bilaterally wheezing in lower lung fields
Abdomen: Soft, nontender =
Extremity: Trace edema b/l, full range of motion of extremities,
Neuro: Alert oriented x3, motor sensory normal, There were no obvious focal 
neurologic abnormalities.
Skin: No appreciable rash on exposed skin, skin is warm and dry.
Psych: Mood and affect is normal, memory and judgment is normal.
 
Core Measures
ACS in differential dx? Yes
CVA/TIA Diagnosis No
Sepsis Present: No
Sepsis Focused Exam Completed? No
(Mehdi Wright)
 
Progress
Differential Diagnosis: asthma, AMI, bronchitis, CHF, COPD, musculoskeletal pain
, pulmonary embolism, pneumonia, unstable angina
Plan of Care:
 Orders
 
 
Procedure Date/time Status
 
Heart Healthy Diet  B Active
 
Heart Healthy Diet  D Complete
 
TRC EVALUATION (GEN)  1442 Active
 
OXYGEN SETUP (GEN)  1442 Active
 
Pathway - chart  1442 Active
 
House Staff  1442 Active
 
Patient Data  1442 Active
 
Code Status  1442 Active
 
D-DIMER  1439 Active
 
Weight  1344 Active
 
Vital Signs  1344 Active
 
Teach/Educate  1344 Active
 
Pain Treatment and Response  1344 Active
 
Nutritional Intake, Monitor  1344 Active
 
Isolation  1344 Active
 
Intake & Output  1344 Active
 
Patient Care Conference  1344 Active
 
Activity/Ambulation  1344 Active
 
RAPID VIRAL INFLUENZA A  1252 Complete
 
THROAT CULTURE W/QUICK STREP  1252 Active
 
Patient Data  1202 Active
 
ED Holding Orders  1155 Active
 
Admit to inpatient  1155 Active
 
Vital Signs  1155 Active
 
Code Status  1155 Complete
 
D-DIMER  1001 Complete
 
Intake & Output  0949 Active
 
EKG  0945 Active
 
Telemetry/Cardiac Monitor  0944 Active
 
BLOOD CULTURE  0944 Active
 
TROPONIN LEVEL  0944 Complete
 
COMPREHENSIVE METABOLIC PANEL  0944 Complete
 
CBC WITHOUT DIFFERENTIAL  0944 Complete
 
B-TYPE NATRIURETIC PEP (BNP)  0944 Complete
 
US-EXT BILAT VENOUS DOPPLER   UNK Active
 
Lab Add-on Test   UNK Active
 
VTE Mechanical Prophylaxis   UNK Active
 
Vital Signs   UNK Active
 
Nursing Misc   UNK Active
 
Intake & Output   UNK Active
 
Hemoccult   UNK Active
 
 
 Current Medications
 
 
  Sig/No Start time  Last
 
Medication Dose  Stop Time Status Admin
 
Methylprednisolone 40 MG Q12  0900 AC 
 
(Solumedrol)     
 
Atorvastatin Calcium 5 MG 1700  1700 AC 
 
(Lipitor)     1652
 
Enoxaparin Sodium 40 MG DAILY  1548 AC 
 
(Lovenox)     1651
 
Aspirin Buffered 81 MG DAILY  1518 AC 
 
(Ecotrin)     1651
 
Levothyroxine Sodium 0.1 MG DAILY AC  1518 AC 
 
(Synthroid)     1651
 
Losartan Potassium 25 MG DAILY  1238 AC 
 
(Cozaar)     1336
 
 
 Laboratory Tests
 
 
 
18 1001:
Anion Gap 6, Estimated GFR > 60, BUN/Creatinine Ratio 28.3  H, Glucose 93, 
Calcium 8.8, Total Bilirubin 1.0, AST 31, ALT 36, Alkaline Phosphatase 57, 
Troponin I 0.01, Pro-B-Natriuretic Pept 655  H, Total Protein 6.6, Albumin 3.6, 
Globulin 3.0, Albumin/Globulin Ratio 1.2, D-Dimer High Sensitivty 475  H, CBC w 
Diff NO MAN DIFF REQ, RBC 4.28, MCV 96.5, MCH 32.0  H, MCHC 33.2, RDW 13.6, MPV 
9.6, Gran % 78.4  H, Lymphocytes % 12.0  L, Monocytes % 7.1, Eosinophils % 1.7, 
Basophils % 0.8, Absolute Granulocytes 4.3, Absolute Lymphocytes 0.7  L, 
Absolute Monocytes 0.4, Absolute Eosinophils 0.1, Absolute Basophils 0
 Microbiology
 1300  NASOPHARYN: Influenza Virus A & B Rapid Smear - COMP
 1010  BLOOD: Blood Culture - RECD
 1001  BLOOD: Blood Culture - RECD
 
Labs ordered old records reviewed patient medicated with Solu-Medrol DuoNeb.  
Case discussed with Dr. Stewart agrees with plan
 
 
2018 10:37:12 AM patient reports after breathing treatment that she feels 
more open, sats remained 95% on room air
 
 
2330 pts ox sat 88% on ra, placed on 2L, I discussed with her at length all of 
her lab results x-ray findings given findings I do not believe discharge at this
time would BE medically safe and she agrees with
Diagnostic Imaging:
Viewed by Me: Radiology Read.  Discussed w/RAD: Radiology Read. 
Radiology Impression: PATIENT: LUCAS DOOLEY  MEDICAL RECORD NO: [id number] PRESENT 
AGE: 87  PATIENT ACCOUNT NO: [id number] : 30  LOCATION: [facility] ORDERING 
PHYSICIAN: Mehdi CABRERA     SERVICE DATE:  EXAM TYPE: RAD - XRY-
PORTABLE CHEST XRAY EXAMINATION: XR PORTABLE CHEST CLINICAL INFORMATION: 
Dyspnea. Rule out pneumonia. COMPARISON: Chest x-ray dated 2018, 2018
and 10/22/2010. TECHNIQUE: Portable AP semierect view of the chest was obtained.
FINDINGS: The cardiomediastinal silhouette is enlarged, likely related to the AP
semiupright portable technique. Ectasia and tortuosity of the aorta is also seen
, unchanged. Lungs bilaterally are symmetrically mildly hyperinflated and 
demonstrate minimal bibasilar linear atelectatic changes, left greater than 
right. No focal consolidation, effusion or pneumothorax is seen. Bony structures
are unremarkable. IMPRESSION: Findings suggestive of obstructive lung disease 
with mild bibasilar linear subsegmental atelectasis. No focal pneumonia. 
DICTATED BY: Maritza Whitfield MD  DATE/TIME DICTATED:18 
:RAD.CONCEPCION  DATE/TIME TRANSCRIBED:18 CONFIDENTIAL, 
DO NOT COPY WITHOUT APPROPRIATE AUTHORIZATION.  <Electronically signed in Other 
Vendor System>                                                                  
                     SIGNED BY: Maritza Whitfield MD 18 1103
Initial ED EKG: nsr at 80, no acute st seg changes, normal axis
Prior EKG: unchanged (18)
Rhythm Strip: normal sinus rhythm
(Mehdi Wright)
 
Departure
 
Departure
Time of Disposition: 1142
Disposition: STILL A PATIENT
Condition: Stable
Clinical Impression
Primary Impression: COPD exacerbation
Referrals:
Christopher CHIU,Wellington CHEUNG (PCP/Family)
 
Departure Forms:
Customer Survey
General Discharge Information
 
Admission Note
Spoke With:
Chad Dolan MD
Documentation of Exam:
Documentation of any treatments & extenuating circumstances including Concerns 
Regarding Discharge (functional status, medication knowledge or non-compliance, 
living conditions, etc.) that warrant an admission rather than observation: [
pulm consult, iv steroids, iv abx, trend labs, premature discharge would be 
medically harmful as pt is not normally on home o2, desaturates on room air to 
88%. 
 
(Mehdi Wright)
 
PA/NP Co-Sign Statement
Statement:
ED Attending supervision documentation-
 
[X] I saw and evaluated the patient. I have also reviewed all the pertinent lab 
results and diagnostic results. I agree with the findings and the plan of care 
as documented in the PA's/NP's documentation. 
 
[X] I have reviewed the ED Record and agree with the PA's/NP's documentation.
 
[] Additions or exceptions (if any) to the PAs/NP's note and plan are 
summarized below:
[Patient needs admission for COPD exacerbation.  Patient will need IV 
antibiotics, IV steroids, pulmonary consultation, nebulizers]
 
(Pat CHIU,Robbie CONTRERAS)
 
Critical Care Note
 
Critical Care Note
Critical Care Time: non-applicable
(Mehdi Wright)

## 2018-05-17 NOTE — ADMISSION CERTIFICATION
Admission Certification
 
Certification Statement
- As attending physician, I certify that at the time of
- admission, based on clinical presentation, severity of
- symptoms, need for further diagnostic testing and
- therapeutic interventions, and risk of adverse outcomes
- without in-hospital treatment, in my clinical assessment,
- this patient requires an acute hospital stay for a minimum
- of two nights or longer. I have also considered psychsocial
- factors such as support system, advanced age, financial
- issues, cognitive issues, and failed out-patient treatments,
- past re-admission history, safety of patient, and lack of
- compliance as applicable.
Specific rationale supporting this admission is:
acute copd exacerbation.

## 2018-05-17 NOTE — ULTRASOUND REPORT
EXAMINATION:
BILATERAL TRIPLEX SCANNING OF THE LOWER EXTREMITIES
 
CLINICAL INFORMATION:
Lower extremity swelling.
 
COMPARISON:
None.
 
TECHNIQUE:
Color-flow triplex imaging with spectral analysis and compression Doppler
were performed on the lower extremities.
 
FINDINGS:
Respiratory variation, normal compression and augmented flow are noted
throughout the lower extremities. The visualized common femoral vein,
superficial femoral vein, profunda femoral vein, popliteal vein and mid calf
peroneal and posterior tibial venous segments show no evidence of deep venous
thrombosis.
There is no Baker's cyst.
 
IMPRESSION:
Normal triplex scan without evidence of deep venous thrombosis involving the
lower extremities.

## 2018-05-18 VITALS — DIASTOLIC BLOOD PRESSURE: 70 MMHG | SYSTOLIC BLOOD PRESSURE: 140 MMHG

## 2018-05-18 VITALS — SYSTOLIC BLOOD PRESSURE: 136 MMHG | DIASTOLIC BLOOD PRESSURE: 72 MMHG

## 2018-05-18 NOTE — PN- HOUSESTAFF
Shane CHIU,[facility] 18 0732:
Subjective
Follow-up For:
AECOPD
Subjective:
Ms Enciso was seen and examined this morning. She reports no issues overnight and
was able to get some rest. Finds her breathing is bettter. She does continue to 
experience a cough, this is non productive. 
Denies and fever, chills, nausea or vomiting. 
Tolerating by mouth intake well.
 
Review of Systems
Constitutional:
Reports: see HPI. 
 
Objective
Last 24 Hrs of Vital Signs/I&O
 Vital Signs
 
 
Date Time Temp Pulse Resp B/P B/P Pulse O2 O2 Flow FiO2
 
     Mean Ox Delivery Rate 
 
 0904  80  108/50     
 
 2259       Nasal 2.0L 
 
       Cannula  
 
 2232      97 Nasal 2.0L 
 
       Cannula  
 
 2227 98.1 76 20 130/80  97 Room Air  
 
 1432 98.4 96 20 120/80  97 Room Air  
 
 1352       Nasal 2.0L 
 
       Cannula  
 
 1336  87  176/81     
 
05 1302 97.9 87 15 176/81  95 Nasal 2.0L 
 
       Cannula  
 
 1201 98.0 88 18 182/81  98 Nasal 2.0L 
 
       Cannula  
 
 
 Intake & Output
 
 
  1600  0800  0000
 
Intake Total  360 480
 
Output Total   300
 
Balance  360 180
 
    
 
Intake, Oral  360 480
 
Number   1
 
Bowel   
 
Movements   
 
Output, Urine   300
 
Patient  77.111 kg 
 
Weight   
 
 
 
 
Physical Exam
General Appearance: Alert, Oriented X3
HEENT: Mucous Membr. moist/pink
Cardiovascular: Regular Rate, Normal S1, Normal S2
Lungs: Expiratory wheezing 
Abdomen: Normal Bowel Sounds, Soft, No Tenderness
Neurological: Normal Gait, Normal Speech
Extremities: No Clubbing, No Cyanosis, No Edema
Vascular: Normal Pulses
Current Medications:
 Current Medications
 
 
  Sig/No Start time  Last
 
Medication Dose Route Stop Time Status Admin
 
Albuterol Sulfate 3 ML TID  0900 AC 
 
  INH   1321
 
Aspirin Buffered 81 MG DAILY  1518 AC 
 
  PO   0904
 
Atorvastatin Calcium 5 MG 1700  1700 AC 
 
  PO   1652
 
Enoxaparin Sodium 40 MG DAILY  1548 AC 
 
  SC   0905
 
Ipratropium Bromide 2.5 ML TID  0900 AC 
 
  INH   1322
 
Levothyroxine Sodium 0.1 MG DAILY AC  1518 AC 
 
  PO   0559
 
Losartan Potassium 25 MG DAILY  1238 AC 
 
  PO   0904
 
Methylprednisolone 40 MG Q12  0900 AC 
 
  IV   0903
 
Patient Medication  1 ED ONE ONE  1500 DC 
 
Teaching  ED  1501  
 
Potassium Chloride 40 MEQ ONCE ONE  1600 AC 
 
  PO  1601  
 
 
 
 
Last 24 Hrs of Lab/Raji Results
Last 24 Hrs of Labs/Mics:
 Laboratory Tests
 
18 1045:
Anion Gap 6, Estimated GFR > 60, BUN/Creatinine Ratio 23.3
 
 
Assessment/Plan
Assessment:
Ms Enciso is a  87 year old female with past medical history of hypertension, 
hyperlipidemia, COPD not on oxygen, GERD, hypothyroidism, and IBS presents to 
the hospital with CC of worsening dyspnea and productive cough for the past 2-3 
days.  Her symptoms improved after receiving supplemental oxygen and received 
the ED.
 
Chest x-ray negative.
 
Problem list
#AECOPD
#Hyponatremia
#Hypothyroidism
 
Plan


mouth based on symptoms.

monitor blood pressure closely

extremities which ruled out for any DVT.
-Potassium was supplemented this a.m.  Will monitor BEP on 2018



 
Problem List:
 1. COPD exacerbation
 
 2. Dyspnea
 
Pain Ratin
Pain Location:
No Pain 
Pain Goal: Remain pain free
Pain Plan:
Tylenol PRN
Tomorrow's Labs & Rationales:
BEP: monitor NA and K
 
 
Dimas Douglas 18 1544:
Attending MD Review Statement
 
Attending Statement
Attending MD Statement: examined this patient, discuss w/resident/PA/NP, agreed 
w/resident/PA/NP, reviewed EMR data (avail), discussed with nursing, discussed 
with case mgmt
Attending Assessment/Plan:
Acute copd exacerbation with hypoxic resp failure- still having wheezing and 
cough. Cont on iv steroids and TRC nebs. 
 
Hyponatremia- sodium 133 - better. dced hctz. cont to monitor sodium
 
Hypokalemia- K 3.3, replace po and recheck in am. 
 
d/w pt the care plan

## 2018-05-19 VITALS — SYSTOLIC BLOOD PRESSURE: 150 MMHG | DIASTOLIC BLOOD PRESSURE: 90 MMHG

## 2018-05-19 VITALS — SYSTOLIC BLOOD PRESSURE: 120 MMHG | DIASTOLIC BLOOD PRESSURE: 82 MMHG

## 2018-05-19 VITALS — SYSTOLIC BLOOD PRESSURE: 114 MMHG | DIASTOLIC BLOOD PRESSURE: 82 MMHG

## 2018-05-19 LAB
ABSOLUTE GRANULOCYTE CT: 7.2 /CUMM (ref 1.4–6.5)
BASOPHILS # BLD: 0 /CUMM (ref 0–0.2)
BASOPHILS NFR BLD: 0.2 % (ref 0–2)
EOSINOPHIL # BLD: 0 /CUMM (ref 0–0.7)
EOSINOPHIL NFR BLD: 0.1 % (ref 0–5)
ERYTHROCYTE [DISTWIDTH] IN BLOOD BY AUTOMATED COUNT: 14 % (ref 11.5–14.5)
GRANULOCYTES NFR BLD: 85.3 % (ref 42.2–75.2)
HCT VFR BLD CALC: 40.1 % (ref 37–47)
LYMPHOCYTES # BLD: 0.7 /CUMM (ref 1.2–3.4)
MCH RBC QN AUTO: 32.5 PG (ref 27–31)
MCHC RBC AUTO-ENTMCNC: 33.6 G/DL (ref 33–37)
MCV RBC AUTO: 96.8 FL (ref 81–99)
MONOCYTES # BLD: 0.5 /CUMM (ref 0.1–0.6)
PLATELET # BLD: 185 /CUMM (ref 130–400)
PMV BLD AUTO: 10.1 FL (ref 7.4–10.4)
RED BLOOD CELL CT: 4.14 /CUMM (ref 4.2–5.4)
WBC # BLD AUTO: 8.4 /CUMM (ref 4.8–10.8)

## 2018-05-19 NOTE — CONS- PULMONARY
General Information and HPI
 
Consulting Request
Date of Consult: 05/19/18
Requested By:
Family/Dr. Douglas
Reason for Consult:
dyspnea, wheezing
Source of Information: patient
Exam Limitations: no limitations
History of Present Illness:
Consultation is requested for an 87-year-old woman.  Patient has been admitted 
for dyspnea and wheezing.
 
Her pulmonary medical history includes having a history of remote bronchitis she
was at the time treated by  over a decade ago.  She does not carry a 
diagnosis of COPD per the patient.  At the time she was diagnosed with 
bronchitis treated with Spiriva that caused a rash.  Ever since she's been off 
inhalers without any issues.  Ever since Mother's Day she developed a dry cough 
with wheezing and exertional dyspnea that has progressively worsened until 
admission.  She does not get bronchitis routinely.  She was also recently 
evaluated by Dr. Lara and was no suspicion of congestive heart disease at the
time.  She has not had any CAT scan guided imaging recently.  She does not have 
any pulmonary function testing on file as well.
 
Her environmental exposures are significant.  She worked as a  however
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  
Office was on top of the garage and she had frequent exposure including 
secondhand smoke and fumes.  She has a personal smoking history that is quite 
remote and quit when she was 35 years old. 
 
Her chest x-ray is without infiltrates however shows hyperinflation.  There is 
also subsegmental atelectasis.  She does not have any leukocytosis or fevers.  
Her echo in April showed moderate aortic sclerosis.  Normal ejection fraction.  
No evidence of pulmonary hypertension.
 
She feels somewhat better rest with reduced wheezing, minimal cough, no leg 
edema at this time, no chest pain no nausea no vomiting no diarrhea no 
constipation.  She does however continues to have exertional dyspnea.
 
Allergies/Medications
Allergies:
Coded Allergies:
linaclotide (From LINZESS) (Severe, WHEEZING, SOB 04/22/18)
tiotropium (UNKNOWN 04/22/18)
 
Home Med List:
Acetaminophen (Pain & Fever) 325 MG TABLET   1 TAB PO Q8P PRN PAIN  (Reported)
Aspirin (Ecotrin*) 81 MG TABLET.DR   1 TAB PO DAILY HEART/BLOOD  (Reported)
Cholecalciferol (Vitamin D3) 1,000 UNIT TABLET   1 TAB PO DAILY VITAMIN SUPPORT 
(Reported)
Cyanocobalamin (Vitamin B-12) 1,000 MCG TABLET   1 TAB PO DAILY VITAMIN SUPPORT 
(Reported)
Escitalopram Oxalate 10 MG TABLET   1 TAB PO DAILY MENTAL HEALTH  (Reported)
Ferrous Sulfate 325 MG (65 MG IRON) TABLET   1 TAB PO TID IRON, VITAMIN  (
Reported)
Fluticasone Propionate 50 MCG/ACTUATION SPRAY.SUSP   2 SPRAY NASB DAILY 
ALLERGIES  (Reported)
Hydrochlorothiazide 12.5 MG CAPSULE   1 CAP PO DAILY DIURETIC/BP  (Reported)
Levothyroxine Sodium 100 MCG TABLET   1 TAB PO DAILY AC THYROID  (Reported)
Loperamide HCl (Loperamide) 2 MG CAPSULE   1 CAP PO Q6 PRN CONSTIPATION  (
Reported)
Losartan Potassium 25 MG TABLET   1 TAB PO DAILY BP  (Reported)
Lovastatin 20 MG TABLET   1 TAB PO DAILY CHOLESTEROL  (Reported)
Magnesium Oxide 250 MG TABLET   1 TAB PO DAILY SUPPLEMENT  (Reported)
Multiple Vitamin (Multivitamins) 1 EACH TABLET   1 TAB PO DAILY SUPPLEMENT  (
Reported)
Naproxen (Naprosyn) 500 MG TABLET   1 TAB PO BID PRN PAIN  (Reported)
Polyethylene Glycol 3350 (Miralax) 17 GRAM POWD.PACK   1 PAC PO DAILY 
CONSTIPATION  (Reported)
     dissolve in water
Sennosides (Senna) 8.6 MG TABLET   1 TAB PO DAILY PRN CONSTIPATION  (Reported)
 
Current Medications:
 Current Medications
 
 
  Sig/No Start time  Last
 
Medication Dose Route Stop Time Status Admin
 
Albuterol Sulfate 3 ML TID 05/18 0900 AC 05/19
 
  INH   0844
 
Aspirin Buffered 81 MG DAILY 05/17 1518 AC 05/19
 
  PO   0906
 
Atorvastatin Calcium 5 MG 1700 05/17 1700 AC 05/18
 
  PO   1636
 
Enoxaparin Sodium 40 MG DAILY 05/17 1548 AC 05/19
 
  SC   0906
 
Escitalopram Oxalate 10 MG 1800 05/19 1800 AC 
 
  PO   
 
Escitalopram Oxalate 10 MG DAILY 05/19 1005 DC 
 
  PO   
 
Guaifenesin 600 MG Q12 05/19 1219 AC 05/19
 
  PO   1236
 
Ipratropium Bromide 2.5 ML TID 05/18 0900 AC 05/19
 
  INH   0844
 
Levothyroxine Sodium 0.1 MG DAILY AC 05/17 1518 AC 05/19
 
  PO   0553
 
Losartan Potassium 25 MG DAILY 05/17 1238 AC 05/19
 
  PO   0906
 
Methylprednisolone 40 MG Q12 05/18 0900 AC 05/19
 
  IV   0906
 
Patient Medication  1 ED ONE ONE 05/18 1500 DC 
 
Teaching  ED 05/18 1501  
 
Potassium Chloride 40 MEQ ONCE ONE 05/18 1600 DC 05/18
 
  PO 05/18 1601  1636
 
 
 
 
Review of Systems
Comments
18 point review of systems was performed and reviewed. Please see pertinent 
positives and pertinent negatives in the HPI. Otherwise ROS is negative.
 
Past History
 
Travel History
Traveled to [place] past 21 day No
 
Medical History
Blood Transfusion Hx: No
Neurological: NONE
EENT: CATARACTS R EYE
Cardiovascular: hypertension, hyperlipidemia
Respiratory: COPD
Gastrointestinal: GERD
Hepatic: NONE
Renal: urinary incontinence, UTI
Musculoskeletal: ARTHRITIS
Endocrine: HYPOTHYROID
Blood Disorders: NONE
Cancer(s): NONE
GYN/Reproductive: NONE
 
Surgical History
Surgical History: non-contributory
 
Family History
Relations & Conditions If Any:
MOTHER (CVA and DM).
FATHER (CHF, renal stones and gall stones).
SON (DM and MI at 48 years).
 
 
Psychosocial History
Where Do You Live? Assisted Living
Services at Home: None
Smoking Status: Former Smoker
 
Exam & Diagnostic Data
Last 24 Hrs of Vital Signs/I&O
 Vital Signs
 
 
Date Time Temp Pulse Resp B/P B/P Pulse O2 O2 Flow FiO2
 
     Mean Ox Delivery Rate 
 
05/19 0906  86  150/90     
 
05/19 0845      95 Room Air  
 
05/19 0627 98.0 86 18 150/90  98 Room Air  
 
05/18 2204 98.2 72 18 136/72  94 Room Air  
 
05/18 1956      97 Nasal 1.0L 
 
       Cannula  
 
05/18 1600       Nasal 1.0L 
 
       Cannula  
 
05/18 1431 98.5 79 20 140/70  95 Nasal 2.5L 
 
       Cannula  
 
 
 Intake & Output
 
 
 05/19 1600 05/19 0800 05/19 0000
 
Intake Total  200 650
 
Output Total   200
 
Balance  200 450
 
    
 
Intake, Oral  200 650
 
Output, Urine   200
 
 
 
 
Physical Exam
Other Physical Findings:
Generally - Awake, alert and comfortable without distress
Head and neck - on room air
Cardiovascular - S1, S2, no murmurs, rubs or gallops
Lungs - rare rhonchi, prolonged expiratory phase
Abdomen - Bowel sounds positive, soft, non-tender
Extremities - without edema
 
Last 48 Hrs of Labs/Raji:
 Laboratory Tests
 
05/19/18 0740:
Anion Gap 5, Estimated GFR > 60, BUN/Creatinine Ratio 21.7, CBC w Diff NO MAN 
DIFF REQ, RBC 4.14  L, MCV 96.8, MCH 32.5  H, MCHC 33.6, RDW 14.0, MPV 10.1, 
Gran % 85.3  H, Lymphocytes % 8.5  L, Monocytes % 5.9, Eosinophils % 0.1, 
Basophils % 0.2, Absolute Granulocytes 7.2  H, Absolute Lymphocytes 0.7  L, 
Absolute Monocytes 0.5, Absolute Eosinophils 0, Absolute Basophils 0
 
05/18/18 1045:
Anion Gap 6, Estimated GFR > 60, BUN/Creatinine Ratio 23.3
 
05/17/18 1439:
D-Dimer High Sensitivty Cancelled
 
 
Assessment/Plan
Impression/Plan:
Impression
87 year old woman
 
* acute bronchitis with likely post-infectious/inflammatory reactive airways 
disease
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 
hand smoke
 
Plan
-recommend CT chest without contrast to rule out any ILD
-continue solumedrol 40mg iv q12h for now
-trc/nebs
-discontinue ipratropium - patient had a questionable rash to spiriva
-will make further recommendations based on CT findings
-will require outpatient PFTs
 
DVT prophylaxis at all times
 
 
Consult Acknowledgment
- Thank you for your consult request.

## 2018-05-19 NOTE — PN- HOUSESTAFF
Breann Krause 18 0922:
Subjective
Follow-up For:
Questionable COPD exacerbation
Reactive airway disease
Complaints: no complaints
Subjective:
Patient was seen and examined this morning.  She was lying comfortably in bed 
without any significant complaints.  She does mention that her throat seems like
slightly irritated and she has hard time bringing up phlegm.  She remained 
hemodynamically stable.  She is saturating fine on room air.  On auscultation 
she was found to have bilateral wheezes.  Of note she had significant exposures 
to chemicals in the past.
 
Review of Systems
Constitutional:
Denies: chills, diaphoresis, fever. 
EENTM:
Denies: blurred vision, eye pain. 
Cardiovascular:
Denies: chest pain, edema. 
Respiratory:
Reports: cough.  Denies: hemoptysis. 
Gastrointestinal:
Denies: bloating, constipation. 
Genitourinary:
Denies: discharge, frequency. 
Musculoskeletal:
Denies: gout, joint swelling. 
 
Objective
Last 24 Hrs of Vital Signs/I&O
 Vital Signs
 
 
Date Time Temp Pulse Resp B/P B/P Pulse O2 O2 Flow FiO2
 
     Mean Ox Delivery Rate 
 
 1426 98.3 80 20 114/82  95   
 
 0906  86  150/90     
 
 0845      95 Room Air  
 
 0627 98.0 86 18 150/90  98 Room Air  
 
 2204 98.2 72 18 136/72  94 Room Air  
 
 1956      97 Nasal 1.0L 
 
       Cannula  
 
 1600       Nasal 1.0L 
 
       Cannula  
 
 
 Intake & Output
 
 
  1600  0800  0000
 
Intake Total  200 650
 
Output Total   200
 
Balance  200 450
 
    
 
Intake, Oral  200 650
 
Output, Urine   200
 
 
 
 
Physical Exam
General Appearance: Alert, Oriented X3, Cooperative, No Acute Distress
Cardiovascular: Regular Rate, Normal S1, Normal S2, No Murmurs
Lungs: bilateral expiratory wheezes
Abdomen: Soft, No Tenderness, No Hepatospenomegaly
Current Medications:
 Current Medications
 
 
  Sig/No Start time  Last
 
Medication Dose Route Stop Time Status Admin
 
Albuterol Sulfate 3 ML TID  0900 AC 
 
  INH   1400
 
Aspirin Buffered 81 MG DAILY  1518 AC 
 
  PO   0906
 
Atorvastatin Calcium 5 MG 1700  1700 AC 
 
  PO   1636
 
Enoxaparin Sodium 40 MG DAILY  1548 AC 
 
  SC   0906
 
Escitalopram Oxalate 10 MG 1800  1800 AC 
 
  PO   
 
Escitalopram Oxalate 10 MG DAILY  1005 DC 
 
  PO   
 
Guaifenesin 600 MG Q12  1219 AC 
 
  PO   1236
 
Ipratropium Bromide 2.5 ML TID  0900 DC 
 
  INH   0844
 
Levothyroxine Sodium 0.1 MG DAILY AC  1518 AC 
 
  PO   0553
 
Losartan Potassium 25 MG DAILY  1238 AC 
 
  PO   0906
 
Methylprednisolone 40 MG Q12  0900 AC 
 
  IV   0906
 
Patient Medication  1 ED ONE ONE  1500 DC 
 
Teaching  ED  1501  
 
Potassium Chloride 40 MEQ ONCE ONE  1600 DC 
 
  PO  1601  1636
 
 
 
 
Last 24 Hrs of Lab/Raji Results
Last 24 Hrs of Labs/Mics:
 Laboratory Tests
 
18 0740:
Anion Gap 5, Estimated GFR > 60, BUN/Creatinine Ratio 21.7, CBC w Diff NO MAN 
DIFF REQ, RBC 4.14  L, MCV 96.8, MCH 32.5  H, MCHC 33.6, RDW 14.0, MPV 10.1, 
Gran % 85.3  H, Lymphocytes % 8.5  L, Monocytes % 5.9, Eosinophils % 0.1, 
Basophils % 0.2, Absolute Granulocytes 7.2  H, Absolute Lymphocytes 0.7  L, 
Absolute Monocytes 0.5, Absolute Eosinophils 0, Absolute Basophils 0
 
 
Assessment/Plan
Assessment:
Ms Enciso is a  87 year old female with past medical history of hypertension, 
hyperlipidemia, COPD not on oxygen, GERD, hypothyroidism, and IBS presents to 
the hospital with CC of worsening dyspnea and productive cough for the past 2-3 
days.  Her symptoms improved after receiving supplemental oxygen and received 
the ED.
 
Chest x-ray negative.
 
Problem list
#?AECOPD/interstitial lung disease needs to ruled out
#Hyponatremia improving
#Hypothyroidism
 
Plan


was called on family request and will follow his recommendations.

monitor blood pressure closely

negative for evidence of DVT.
-We will get a CAT scan of the chest to rule out interstitial lung disease given
her significant history of chemical exposure in the past.  Patient needs to be 
follow-up with Dr. Varela as outpatient.



Problem List:
 1. COPD exacerbation
 
 2. Dyspnea
 
Pain Ratin
Pain Location:
 na
Pain Goal: Remain pain free
Pain Plan:
tylenol
Tomorrow's Labs & Rationales:
cbc and bep
 
 
Douglas,Kanwardeep 18 1501:
Attending MD Review Statement
 
Attending Statement
Attending MD Statement: examined this patient, discuss w/resident/PA/NP, agreed 
w/resident/PA/NP, reviewed EMR data (avail), discussed with nursing
Attending Assessment/Plan:
Acute copd exacerbation and acute hypoxic resp failure- on room air now. Seen by
pulmonary . d/w dr Varela. Will get CT chest without contrast. 
 
Hyponatremia - resolved. sodium 136. will resume lexapro and reheck sodium in am
.
 
d/w pt the care plan.

## 2018-05-20 VITALS — SYSTOLIC BLOOD PRESSURE: 118 MMHG | DIASTOLIC BLOOD PRESSURE: 60 MMHG

## 2018-05-20 VITALS — SYSTOLIC BLOOD PRESSURE: 156 MMHG | DIASTOLIC BLOOD PRESSURE: 90 MMHG

## 2018-05-20 VITALS — DIASTOLIC BLOOD PRESSURE: 70 MMHG | SYSTOLIC BLOOD PRESSURE: 160 MMHG

## 2018-05-20 LAB
ABSOLUTE GRANULOCYTE CT: 5.1 /CUMM (ref 1.4–6.5)
BASOPHILS # BLD: 0 /CUMM (ref 0–0.2)
BASOPHILS NFR BLD: 0.4 % (ref 0–2)
EOSINOPHIL # BLD: 0 /CUMM (ref 0–0.7)
EOSINOPHIL NFR BLD: 0.5 % (ref 0–5)
ERYTHROCYTE [DISTWIDTH] IN BLOOD BY AUTOMATED COUNT: 14.2 % (ref 11.5–14.5)
GRANULOCYTES NFR BLD: 72.9 % (ref 42.2–75.2)
HCT VFR BLD CALC: 42.5 % (ref 37–47)
LYMPHOCYTES # BLD: 1.2 /CUMM (ref 1.2–3.4)
MCH RBC QN AUTO: 32.7 PG (ref 27–31)
MCHC RBC AUTO-ENTMCNC: 33.7 G/DL (ref 33–37)
MCV RBC AUTO: 97 FL (ref 81–99)
MONOCYTES # BLD: 0.6 /CUMM (ref 0.1–0.6)
PLATELET # BLD: 189 /CUMM (ref 130–400)
PMV BLD AUTO: 10.1 FL (ref 7.4–10.4)
RED BLOOD CELL CT: 4.38 /CUMM (ref 4.2–5.4)
WBC # BLD AUTO: 7 /CUMM (ref 4.8–10.8)

## 2018-05-20 NOTE — PN- PULMONARY
Subjective
HPI/Critical Care Issues:
pt seen and examined
cough is minimal with an occasional yellow phlegm
dyspnea primarily on exertion improving
no cp
no n/v/d/c
no headaches
 
Objective
Current Medications:
 Current Medications
 
 
  Sig/No Start time  Last
 
Medication Dose Route Stop Time Status Admin
 
Albuterol Sulfate 3 ML TID 05/18 0900 AC 05/20
 
  INH   0808
 
Aspirin Buffered 81 MG DAILY 05/17 1518 AC 05/20
 
  PO   0905
 
Atorvastatin Calcium 5 MG 1700 05/17 1700 AC 05/19
 
  PO   1556
 
Budesonide/ 2 PUF BID 05/20 1044 AC 
 
Formoterol Fumarate  INH   
 
Enoxaparin Sodium 40 MG DAILY 05/17 1548 AC 05/20
 
  SC   0905
 
Escitalopram Oxalate 10 MG 1800 05/19 1800 AC 05/19
 
  PO   1558
 
Guaifenesin 600 MG Q12 05/19 1219 DC 05/20
 
  PO   0905
 
Guaifenesin/Codeine  10 ML Q4P PRN 05/20 1000 AC 
 
Phosphate  PO   
 
Ipratropium Bromide 2.5 ML TID 05/18 0900 DC 05/19
 
  INH   0844
 
Levothyroxine Sodium 0.1 MG DAILY AC 05/17 1518 AC 05/20
 
  PO   0605
 
Losartan Potassium 25 MG DAILY 05/17 1238 AC 05/20
 
  PO   0905
 
Methylprednisolone 40 MG Q12 05/18 0900 DC 05/20
 
  IV   0905
 
Prednisone 40 MG DAILY 05/20 1044 AC 
 
  PO   
 
 
 
 
Vital Signs & I&O
Last 24 Hrs of Vitals and I&O:
 Vital Signs
 
 
Date Time Temp Pulse Resp B/P B/P Pulse O2 O2 Flow FiO2
 
     Mean Ox Delivery Rate 
 
05/20 0905  67  156/90     
 
05/20 0812      95 Room Air  
 
05/20 0620 98.7 67 16 156/90  94 Room Air  
 
05/20 0321       Room Air  
 
05/20 0000       Room Air  
 
05/19 2233 97.9 81 20 120/82  94 Room Air  
 
05/19 1950      96 Room Air  
 
05/19 1426 98.3 80 20 114/82  95   
 
 
 Intake & Output
 
 
 05/20 1600 05/20 0800 05/20 0000
 
Intake Total  100 400
 
Output Total   
 
Balance  100 400
 
    
 
Intake, Oral  100 400
 
Number  0 
 
Bowel   
 
Movements   
 
 
 
 
Exam
Other Physical Findings:
Generally - Awake, alert and comfortable without distress
Head and neck - on room air
Cardiovascular - S1, S2, no murmurs, rubs or gallops
Lungs - improved rhonchi
Abdomen - Bowel sounds positive, soft, non-tender
Extremities - without edema
 
Results
Last 24 Hrs of Lab Results:
 Laboratory Tests
 
05/20/18 0708:
Anion Gap 4  L, Estimated GFR > 60, BUN/Creatinine Ratio 24.0, CBC w Diff NO MAN
DIFF REQ, RBC 4.38, MCV 97.0, MCH 32.7  H, MCHC 33.7, RDW 14.2, MPV 10.1, Gran %
72.9, Lymphocytes % 17.8  L, Monocytes % 8.4, Eosinophils % 0.5, Basophils % 0.4
, Absolute Granulocytes 5.1, Absolute Lymphocytes 1.2, Absolute Monocytes 0.6, 
Absolute Eosinophils 0, Absolute Basophils 0
 
 
Impression/Plan
 
Impression/Plan
Impression/Plan:
Impression
87 year old woman
 
* acute bronchitis with likely post-infectious/inflammatory reactive airways 
disease
* atelectasis, with hx of RML bleb and mild scarring
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 
hand smoke
 
Plan
-discontinue solumedrol
-begin prednisone 40mg x 2 days, 30x2, 20x2, 10x2 then stop
-begin symbicort 160/4.5 2 puffs bid with rinsing of the mouth
-continue trc/nebs
-keep off ipratropium - patient had a questionable rash to spiriva
-will require outpatient PFTs
 
DC planning within 24 hours
 
DVT prophylaxis at all times

## 2018-05-20 NOTE — DISCHARGE SUMMARY
Visit Information
 
Visit Dates
Admission Date:
05/17/18
 
Discharge Date:
5/21/2018
 
 
Hospital Course
 
Course
Attending Physician:
Misael CHIU,Dimas RUIZ
 
Primary Care Physician:
Wellington White 
[facility] Hospital Course:
Ms Enciso is a  87 year old female with past medical history of hypertension, 
hyperlipidemia, COPD not on oxygen, GERD, hypothyroidism, and IBS who presented 
to the hospital with CC of worsening dyspnea and productive cough for the past 2
-3 days prior to admission. 
Prior to coming in, the patient had seen her cardiologist Dr. Lara.  There 
was no suspicion of any CHF that time.
 
Vitals: temperature 98.1, pulse 85, respirations 18, blood pressure 199/85 pulse
ox 95% on room air.
 
Labs WBC 5.4, H&H 13.7 41.3, platelets 187, sodium 1:30, potassium 3.8, chloride
90, bicarbonate 33. BUN:17, creatinine 0.6, proBNP 655.
 
She was admitted to the Gen. medical service and below is a summary of the care 
she received under us.
 
Problem list: 
#AECOPD
#Acute bronchitis with inflammatory/post infectious reactive airway disease
#History of environmental exposures in the work place
#Hyponatremia
#Hypothyroidism
 
Patient was initially started on methylprednisolone 40 mg every 12 hours.  She 
was also maintained on nebulizer treatments.  On 5/20/2018 we began the patient 
on prednisone by mouth with a taper every 2 days.  She was also begun on 
Symbicort was 160/4.5 2 puffs twice a day.  We discontinued the patient's 
ipratropium owing to history of a rash.  
Patient was given instructions to follow-up with the pulmonologist as an 
outpatient. She will likely require outpatient pulmonary function tests.  
CT scan of the chest was also done to rule out interstitial lung disease.  
 
On admission, patient did have bilateral lower extremity edema and a Doppler 
ultrasound  was done to rule out a DVT.  Results of this have been attached on 
this report.
 
Hydrochlorothiazide was stopped owing to the fact that she was hyponatremic.  
Other blood pressure medications were continued.
 
Patient was a full code of the course of admission.
Allergies:
Coded Allergies:
linaclotide (From LINZESS) (Severe, WHEEZING, SOB 04/22/18)
tiotropium (UNKNOWN 04/22/18)
 
Pertinent Lab Results:
SERVICE DATE: 05/19/18-
EXAM TYPE: CAT - CT CHEST WO IV CONTRAST
 
EXAMINATION:
CT CHEST WITHOUT CONTRAST
 
CLINICAL INFORMATION:
Shortness of breath and wheezing.
 
COMPARISON:
04/22/2018.
 
TECHNIQUE:
Contiguous axial thin section helical images of the chest were performed
without contrast. The data set was reformatted in the coronal and sagittal
planes and reviewed on an independent workstation.
 
DLP: 394 mGy-cm.
 
FINDINGS:
The heart is of normal size. There is no pericardial effusion. There is
neither mediastinal, hilar nor axillary lymphadenopathy. There are no chest
wall masses.
 
Review of lung windows demonstrates that there are neither pleural effusions
nor pneumothoraces. There is stable scarring within the right middle lobe.
There is a stable bleb within the medial segment right middle lobe measuring
5.3 cm. There is groundglass opacification present within the lateral and
posterior basal segments of the right lower lobe as well as the posterior
basal segment of the left lower lobe. There are no pulmonary parenchymal
nodules.
 
Images of the upper abdomen demonstrate that the liver is of normal size and
attenuation without focal lesions. Normal adrenal glands are identified.
 
Bone windows: Neither sclerotic nor lytic bone lesions are identified.
 
IMPRESSION:
 
Bibasilar streaky and groundglass opacification likely representative of
atelectasis. Stable scarring and bleb within the right middle lobe.
 
DICTATED BY: Michael Zaldivar MD 
 
 
SERVICE DATE: 05/17/18-
EXAM TYPE: US - US-EXT BILAT VENOUS DOPPLER
 
EXAMINATION:
BILATERAL TRIPLEX SCANNING OF THE LOWER EXTREMITIES
 
CLINICAL INFORMATION:
Lower extremity swelling.
 
COMPARISON:
None.
 
TECHNIQUE:
Color-flow triplex imaging with spectral analysis and compression Doppler
were performed on the lower extremities.
 
FINDINGS:
Respiratory variation, normal compression and augmented flow are noted
throughout the lower extremities. The visualized common femoral vein,
superficial femoral vein, profunda femoral vein, popliteal vein and mid calf
peroneal and posterior tibial venous segments show no evidence of deep venous
thrombosis.
There is no Baker's cyst.
 
IMPRESSION:
Normal triplex scan without evidence of deep venous thrombosis involving the
lower extremities.
 
DICTATED BY: Misha Ferraro MD 
 
SERVICE DATE: 05/17/
EXAM TYPE: RAD - XRY-PORTABLE CHEST XRAY
 
EXAMINATION:
XR PORTABLE CHEST
 
CLINICAL INFORMATION:
Dyspnea. Rule out pneumonia.
 
COMPARISON:
Chest x-ray dated 05/01/2018, 4/22/2018 and 10/22/2010.
 
TECHNIQUE:
Portable AP semierect view of the chest was obtained.
 
FINDINGS:
The cardiomediastinal silhouette is enlarged, likely related to the AP
semiupright portable technique. Ectasia and tortuosity of the aorta is also
seen, unchanged.
 
Lungs bilaterally are symmetrically mildly hyperinflated and demonstrate
minimal bibasilar linear atelectatic changes, left greater than right. No
focal consolidation, effusion or pneumothorax is seen.
 
Bony structures are unremarkable.
 
IMPRESSION:
Findings suggestive of obstructive lung disease with mild bibasilar linear
subsegmental atelectasis. No focal pneumonia.
 
 
DICTATED BY: Maritza Whitfield MD
 
 
SERVICE DATE: 05/19/18-
EXAM TYPE: CAT - CT CHEST WO IV CONTRAST
 
EXAMINATION:
CT CHEST WITHOUT CONTRAST
 
CLINICAL INFORMATION:
Shortness of breath and wheezing.
 
COMPARISON:
04/22/2018.
 
TECHNIQUE:
Contiguous axial thin section helical images of the chest were performed
without contrast. The data set was reformatted in the coronal and sagittal
planes and reviewed on an independent workstation.
 
DLP: 394 mGy-cm.
 
FINDINGS:
The heart is of normal size. There is no pericardial effusion. There is
neither mediastinal, hilar nor axillary lymphadenopathy. There are no chest
wall masses.
 
Review of lung windows demonstrates that there are neither pleural effusions
nor pneumothoraces. There is stable scarring within the right middle lobe.
There is a stable bleb within the medial segment right middle lobe measuring
5.3 cm. There is groundglass opacification present within the lateral and
posterior basal segments of the right lower lobe as well as the posterior
basal segment of the left lower lobe. There are no pulmonary parenchymal
nodules.
 
Images of the upper abdomen demonstrate that the liver is of normal size and
attenuation without focal lesions. Normal adrenal glands are identified.
 
Bone windows: Neither sclerotic nor lytic bone lesions are identified.
 
IMPRESSION:
 
Bibasilar streaky and groundglass opacification likely representative of
atelectasis. Stable scarring and bleb within the right middle lobe.
 
DICTATED BY: Michael Zaldivar MD  
 
Disposition Summary
 
Disposition
Principal Diagnosis:
AECOPD
Additional Diagnosis:
#Hyponatremia
#Hypothyroidism
Discharge Disposition: home health services
 
Discharge Instructions
 
General Discharge Information
Code Status: Full Code
Patient's Diet:
Heart Healthy (fluid restriction 1200 mL)
Patient's Activity:
As Tolerated
Follow-Up Instructions/Appts:
Follow-up with your primary care physician within 7 days.
Please follow up with the pulmonologist within one to two weeks, you might 
require outpatient PFTs. We have provided you with a referral. 
 
Medications at Discharge
Discharge Medications:
Stop taking the following medications:
Hydrochlorothiazide (Hydrochlorothiazide) 12.5 MG CAPSULE ORAL DAILY Qty = 90
 
Continue taking these medications:
Lovastatin (Lovastatin) 20 MG TABLET
    1 Tablet ORAL DAILY
    Qty = 90
    Comments:
       LAST TAKEN:  5/20/18 @ 5 PM
 
Levothyroxine Sodium (Levothyroxine Sodium) 100 MCG TABLET
    1 Tablet ORAL DAILY BEFORE BREAKFAST
    Qty = 90
    Comments:
       LAST TAKEN:  5/21/18 @ 6 AM
 
Losartan Potassium (Losartan Potassium) 25 MG TABLET
    1 Tablet ORAL DAILY
    Qty = 90
    Comments:
       LAST TAKEN:  5/21/18 @ 9 AM
 
Escitalopram Oxalate (Escitalopram Oxalate) 10 MG TABLET
    1 Tablet ORAL DAILY
    Qty = 90
    Comments:
       LAST TAKEN:  5/20/18 @ 6 PM
 
Aspirin (Ecotrin*) 81 MG TABLET.DR
    1 Tablet ORAL DAILY
    Comments:
       LAST TAKEN:  5/21/18 @ 9AM
 
Multiple Vitamin (Multivitamins) 1 EACH TABLET
    1 Tablet ORAL DAILY
    Comments:
       NOT GIVEN
 
Acetaminophen (Pain & Fever) 325 MG TABLET
    1 Tablet ORAL EVERY 8 HOURS AS NEEDED as needed for PAIN
    Comments:
       NOT GIVEN
 
Cholecalciferol (Vitamin D3) 1,000 UNIT TABLET
    1 Tablet ORAL DAILY
    Comments:
       NOT GIVEN
 
Cyanocobalamin (Vitamin B-12) 1,000 MCG TABLET
    1 Tablet ORAL DAILY
    Comments:
       NOT GIVEN
 
Ferrous Sulfate (Ferrous Sulfate) 325 MG (65 MG IRON) TABLET
    1 Tablet ORAL THREE TIMES DAILY
    Comments:
       NOT GIVEN
 
Fluticasone Propionate (Fluticasone Propionate) 50 MCG/ACTUATION SPRAY.SUSP
    2 Spray Both sides of nose DAILY
    Qty = 16
    Comments:
       NOT GIVEN
 
Loperamide HCl (Loperamide) 2 MG CAPSULE
    1 Capsule ORAL EVERY SIX HOURS as needed for Diarrhea 
    Comments:
       NOT GIVEN
 
Magnesium Oxide (Magnesium Oxide) 250 MG TABLET
    1 Tablet ORAL DAILY
    Comments:
       NOT GIVEN
 
Naproxen (Naprosyn) 500 MG TABLET
    1 Tablet ORAL TWICE DAILY as needed for PAIN
    Comments:
       NOT GIVEN
 
Polyethylene Glycol 3350 (Miralax) 17 GRAM POWD.PACK
    1 Packet ORAL DAILY
    Instructions:
       dissolve in water
    Comments:
       NOT GIVEN
 
Sennosides (Senna) 8.6 MG TABLET
    1 Tablet ORAL DAILY as needed for CONSTIPATION
    Comments:
       NOT GIVEN
 
Start taking the following new medications:
Budesonide/Formoterol Fumarate (Symbicort 160-4.5 Mcg Inhaler) 160 MCG-4.5 MCG/
ACTUATION HFA.AER.AD
    2 Puff Inhale through mouth TWICE DAILY
    Qty = 1
    No Refills
    Instructions:
       .
    Comments:
       LAST TAKEN:  5/21/18 @ 9AM
 
Prednisone (Prednisone) 10 MG TABLET
    1 Tablet ORAL SEE INSTRUCTIONS
    Qty = 12
    No Refills
    Instructions:
       On 5/22-5/23 take 3
       5/24-5/25 take 2
       5/26-5/27 take 1.
    Comments:
       NOT GIVEN
 
 
Copies To:
Christopher CHIU,Wellington CHEUNG; Jane CHIU,HARRIET Hinton; Nichole CHIU,Jason
 
Attending MD Review Statement
Documenting Attending:
Misael CHIU,Dimas RUIZ
Other Findings:
agree with the discharge plan

## 2018-05-20 NOTE — PATIENT DISCHARGE INSTRUCTIONS
Discharge Instructions
 
General Discharge Information
You were seen/treated for:
AECOPD
Special Instructions:
Follow-up with your primary care physician within 7 days.
Please follow up with the pulmonologist within one to two weeks, you might 
require outpatient PFTs. We have provided you with a referral. 
 
Acute Coronary Syndrome
 
Inclusion Criteria
At DC or during hospital stay patient has or had the following:
ACS DIAGNOSIS No
 
Discharge Core Measures
Meds if any: Prescribed or Continued at Discharge
ACE/ARB if EF <40% No
Aspirin No
Beta-Blocker No
Meds if any: NOT Prescribed or Continued at Discharge
 
Congestive Heart Failure
 
Inclusion Criteria
At DC or during hospital stay patient has or had the following:
CHF DIAGNOSIS No
 
Discharge Core Measures
Meds if any: Prescribed or Continued at Discharge
Meds if any: NOT Prescribed or Continued at Discharge
 
Cerebrovascular accident
 
Inclusion Criteria
At DC or during hospital stay patient has or had the following:
CVA/TIA Diagnosis No
 
Discharge Core Measures
Meds if any: Prescribed or Continued at Discharge
Meds if any: NOT Prescribed or Continued at Discharge
 
Venous thromboembolism
 
Inclusion Criteria
VTE Diagnosis No
VTE Type NONE
VTE Confirmed by (Test) NONE
 
Discharge Core Measures
- Per Current guidelines, there needs to be overlap
- treatment for the first 5 days of Warfarin therapy.
- If discharged on Warfarin prior to 5 days of
- overlap therapy, the patient will need to be
- assessed for post discharge needs including
- *Post discharge parental anticoagulation
- *Warfarin and/or parental anticoagulation education
- *Follow up date to check INR post discharge
At least 5 days overlap therapy as Inpatient No
Meds if any: Prescribed or Continued at Discharge
Note: Overlap Therapy is Warfarin and Anticoagulant
Meds if any: NOT Prescribed or Continued at Discharge

## 2018-05-20 NOTE — PN- ATT ADDEND
Assessment/Plan
Assessment/Plan
87 yr old female with pmh of COPD not on home oxygen, hypertension, 
hyperlipidemia, GERD, hypothyroidism, and IBS who presented to the ER with c/c 
of sob for the past 2-3 days. Pt was recently seen in Dr Lara's office and 
was told she does not have CHF. 
Pt says she has hard time sleeping over the last few days. Pt was recently 
admitted to medicine for COPD exacerbation. Pt also was recently in the ED for 
similar complaints. 
 
Acute copd exacerbation- improving now. On room air. switched to po steroids 
40mg po daily for now. CT chest showed some atelectasis and ground glass 
opacities. Appreciated pulm input. 
Will get PT consult in am. 
 
Hyponatremia- sodium 136. restarted on lexapro yesterday. tolerating it. will 
recheck sodium in am. 
DVT proph- on sc lovenox. 
 
 
Consult Acknowledgment
- Thank you for your consult request.
 
Review of Systems
 
Review of Systems
Constitutional:
Denies: chills, fever. 
Cardiovascular:
Denies: chest pain, palpitations. 
Respiratory:
Reports: cough. 
GI:
Denies: abdominal pain. 
Skin:
Denies: jaundice. 
Neurological/Psychological:
Denies: confusion. 
 
PHYSICAL EXAM
Last 24hrs of Vital Signs
Vital Signs
 
 
Date Time Temp Pulse Resp B/P B/P Pulse O2 O2 Flow FiO2
 
     Mean Ox Delivery Rate 
 
05/20 0905  67  156/90     
 
05/20 0812      95 Room Air  
 
05/20 0620 98.7 67 16 156/90  94 Room Air  
 
05/20 0321       Room Air  
 
05/20 0000       Room Air  
 
05/19 2233 97.9 81 20 120/82  94 Room Air  
 
05/19 1950      96 Room Air  
 
05/19 1426 98.3 80 20 114/82  95   
 
 
 
 
Physical Exam
General Appearance Alert, Oriented X3, Cooperative
HEENT Atraumatic
Cardiovascular Regular Rate, Normal S1, Normal S2
Lungs Clear to Auscultation
Abdomen Soft, No Tenderness
Neurological Normal Speech

## 2018-05-21 VITALS — SYSTOLIC BLOOD PRESSURE: 122 MMHG | DIASTOLIC BLOOD PRESSURE: 60 MMHG

## 2018-05-21 VITALS — SYSTOLIC BLOOD PRESSURE: 156 MMHG | DIASTOLIC BLOOD PRESSURE: 80 MMHG

## 2018-05-21 LAB
ABSOLUTE GRANULOCYTE CT: 4 /CUMM (ref 1.4–6.5)
BASOPHILS # BLD: 0 /CUMM (ref 0–0.2)
BASOPHILS NFR BLD: 0.4 % (ref 0–2)
EOSINOPHIL # BLD: 0.1 /CUMM (ref 0–0.7)
EOSINOPHIL NFR BLD: 1 % (ref 0–5)
ERYTHROCYTE [DISTWIDTH] IN BLOOD BY AUTOMATED COUNT: 14.1 % (ref 11.5–14.5)
GRANULOCYTES NFR BLD: 67.9 % (ref 42.2–75.2)
HCT VFR BLD CALC: 41 % (ref 37–47)
LYMPHOCYTES # BLD: 1.3 /CUMM (ref 1.2–3.4)
MCH RBC QN AUTO: 32.6 PG (ref 27–31)
MCHC RBC AUTO-ENTMCNC: 33.4 G/DL (ref 33–37)
MCV RBC AUTO: 97.6 FL (ref 81–99)
MONOCYTES # BLD: 0.5 /CUMM (ref 0.1–0.6)
PLATELET # BLD: 186 /CUMM (ref 130–400)
PMV BLD AUTO: 10.2 FL (ref 7.4–10.4)
RED BLOOD CELL CT: 4.2 /CUMM (ref 4.2–5.4)
WBC # BLD AUTO: 5.9 /CUMM (ref 4.8–10.8)

## 2018-05-21 NOTE — PN- PULMONARY
Subjective
HPI/Critical Care Issues:
Patient seen and examined this morning.  She continues to improve with respect 
to her dyspnea.  She is coughing much less with virtually no phlegm at this 
time.  No fevers no chills.  No nausea, no vomiting, no diarrhea, no headache, 
no chest pain.
 
Objective
Current Medications:
 Current Medications
 
 
  Sig/No Start time  Last
 
Medication Dose Route Stop Time Status Admin
 
Albuterol Sulfate 3 ML TID 05/18 0900 AC 05/21
 
  INH   1420
 
Aspirin Buffered 81 MG DAILY 05/17 1518 AC 05/21
 
  PO   0934
 
Atorvastatin Calcium 5 MG 1700 05/17 1700 AC 05/20
 
  PO   1738
 
Bisacodyl 10 MG ONCE ONE 05/21 0930 DC 05/21
 
  [place] 05/21 0931  0934
 
Budesonide/ 2 PUF BID 05/20 1044 AC 05/21
 
Formoterol Fumarate  INH   0934
 
Enoxaparin Sodium 40 MG DAILY 05/17 1548 AC 05/21
 
  SC   0935
 
Escitalopram Oxalate 10 MG 1800 05/19 1800 AC 05/20
 
  PO   1738
 
Guaifenesin/Codeine  10 ML Q4P PRN 05/20 1000 AC 05/20
 
Phosphate  PO   2334
 
Levothyroxine Sodium 0.1 MG DAILY AC 05/17 1518 AC 05/21
 
  PO   0627
 
Losartan Potassium 25 MG DAILY 05/17 1238 AC 05/21
 
  PO   1036
 
Patient Medication  1 ED ONE ONE 05/21 1400 DC 
 
Teaching  ED 05/21 1401  
 
Prednisone 40 MG DAILY 05/21 0900 AC 05/21
 
  PO   0934
 
 
 
 
Vital Signs & I&O
Last 24 Hrs of Vitals and I&O:
 Vital Signs
 
 
Date Time Temp Pulse Resp B/P B/P Pulse O2 O2 Flow FiO2
 
     Mean Ox Delivery Rate 
 
05/21 1401 97.0 87 18 122/60  95 Room Air  
 
05/21 1036 98.5 68 18 156/80     
 
05/21 0823      94 Room Air  
 
05/21 0620 98.5 68 18 156/80  95 Room Air  
 
05/20 2205 97.8 70 19 160/70  95   
 
05/20 1909      95 Room Air  
 
 
 Intake & Output
 
 
 05/21 1600 05/21 0800 05/21 0000
 
Intake Total  250 250
 
Output Total 400  
 
Balance -400 250 250
 
    
 
Intake, IV  10 10
 
Intake, Oral  240 240
 
Number 1 0 
 
Bowel   
 
Movements   
 
Output, Urine 400  
 
Patient   173 lb
 
Weight   
 
 
 
 
Exam
Other Physical Findings:
Generally - Awake, alert and comfortable without distress
Head and neck - on room air
Cardiovascular - S1, S2, no murmurs, rubs or gallops
Lungs - improved rhonchi
Abdomen - Bowel sounds positive, soft, non-tender
Extremities - without edema
 
Results
Last 24 Hrs of Lab Results:
 Laboratory Tests
 
05/21/18 0715:
Anion Gap 5, Estimated GFR > 60, BUN/Creatinine Ratio 22.9, CBC w Diff NO MAN 
DIFF REQ, RBC 4.20, MCV 97.6, MCH 32.6  H, MCHC 33.4, RDW 14.1, MPV 10.2, Gran %
67.9, Lymphocytes % 21.4, Monocytes % 9.3, Eosinophils % 1.0, Basophils % 0.4, 
Absolute Granulocytes 4.0, Absolute Lymphocytes 1.3, Absolute Monocytes 0.5, 
Absolute Eosinophils 0.1, Absolute Basophils 0
 
 
Impression/Plan
 
Impression/Plan
Impression/Plan:
Impression
87 year old woman
 
* acute bronchitis with likely post-infectious/inflammatory reactive airways 
disease
* atelectasis, with hx of RML bleb and mild scarring
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 
hand smoke
 
Plan
-prednisone 40mg x 2 days, 30x2, 20x2, 10x2 then stop
-symbicort 160/4.5 2 puffs bid with rinsing of the mouth - discharge on 
symbicort
-continue trc/nebs
-keep off ipratropium - patient had a questionable rash to spiriva
-will require outpatient PFTs
 
DC planning - PT/OOB - assess home care needs vs STR
 
DVT prophylaxis at all times

## 2018-05-21 NOTE — PN- HOUSESTAFF
Shane CHIU,[facility] 18 0721:
Subjective
Follow-up For:
Acute copd exacerbation
Subjective:
Ms Enciso was seen and examined this morning.  She is resting comfortably in bed.
 Denies any issues overnight.  States that she was able to get some rest.  She 
denies any fever, chills, nausea, vomiting.  States that dyspnea has improved.  
Has been tolerating by mouth intake well although she states she has not had a 
bowel movement since admission.
 
Review of Systems
Constitutional:
Reports: see HPI. 
 
Objective
Last 24 Hrs of Vital Signs/I&O
 Vital Signs
 
 
Date Time Temp Pulse Resp B/P B/P Pulse O2 O2 Flow FiO2
 
     Mean Ox Delivery Rate 
 
 1036 98.5 68 18 156/80     
 
 0823      94 Room Air  
 
 0620 98.5 68 18 156/80  95 Room Air  
 
 2205 97.8 70 19 160/70  95   
 
 1909      95 Room Air  
 
 1515 98.0 81 18 118/60  95   
 
 
 Intake & Output
 
 
  1600  0800  0000
 
Intake Total  250 250
 
Output Total 400  
 
Balance -400 250 250
 
    
 
Intake, IV  10 10
 
Intake, Oral  240 240
 
Number 1 0 
 
Bowel   
 
Movements   
 
Output, Urine 400  
 
Patient   78.585 kg
 
Weight   
 
 
 
 
Physical Exam
General Appearance: Alert, Oriented X3, Cooperative
Cardiovascular: Regular Rate, Normal S1, Normal S2
Lungs: Normal Air Movement
Abdomen: Normal Bowel Sounds, Soft, No Tenderness
Neurological: Normal Speech
Extremities: No Edema
Current Medications:
 Current Medications
 
 
  Sig/No Start time  Last
 
Medication Dose Route Stop Time Status Admin
 
Albuterol Sulfate 3 ML TID  0900 AC 
 
  INH   0817
 
Aspirin Buffered 81 MG DAILY  1518 AC 
 
  PO   0934
 
Atorvastatin Calcium 5 MG 1700  1700 AC 
 
  PO   1738
 
Bisacodyl 10 MG ONCE ONE  0930 DC 
 
  LA  0931  0934
 
Budesonide/ 2 PUF BID  1044 AC 
 
Formoterol Fumarate  INH   0934
 
Enoxaparin Sodium 40 MG DAILY  1548 AC 
 
  SC   0935
 
Escitalopram Oxalate 10 MG 1800  1800 AC 
 
  PO   1738
 
Guaifenesin/Codeine  10 ML Q4P PRN  1000 AC 
 
Phosphate  PO   2334
 
Levothyroxine Sodium 0.1 MG DAILY AC  1518 AC 
 
  PO   0627
 
Losartan Potassium 25 MG DAILY  1238 AC 
 
  PO   1036
 
Prednisone 40 MG DAILY  0900 AC 
 
  PO   0934
 
Prednisone 40 MG DAILY  1044 DC 
 
  PO   
 
 
 
 
Last 24 Hrs of Lab/Raji Results
Last 24 Hrs of Labs/Mics:
 Laboratory Tests
 
18 0715:
Anion Gap 5, Estimated GFR > 60, BUN/Creatinine Ratio 22.9, CBC w Diff NO MAN 
DIFF REQ, RBC 4.20, MCV 97.6, MCH 32.6  H, MCHC 33.4, RDW 14.1, MPV 10.2, Gran %
67.9, Lymphocytes % 21.4, Monocytes % 9.3, Eosinophils % 1.0, Basophils % 0.4, 
Absolute Granulocytes 4.0, Absolute Lymphocytes 1.3, Absolute Monocytes 0.5, 
Absolute Eosinophils 0.1, Absolute Basophils 0
 
 
Assessment/Plan
Assessment:
Ms Enciso is a  87 year old female with past medical history of hypertension, 
hyperlipidemia, COPD not on oxygen, GERD, hypothyroidism, and IBS presents to 
the hospital with CC of worsening dyspnea and productive cough for the past 2-3 
days.  
 
Her symptoms improved after receiving supplemental oxygen and received the ED.
 
Chest x-ray negative.
 
Problem list
#AECOPD/Acute Bronchitis 
#Hyponatremia
#Hypothyroidism
#History of environmental exposures 
 
Plan

The weekend she was transitioned to by mouth prednisone which she is currently 
receiving. Currenlty on PO 40 mg, this will be titrated down x 2 days. 

monitor blood pressure closely
-Worked with physical therapy will likely be discharged later today. 



Problem List:
 1. COPD exacerbation
 
Pain Ratin
Pain Location:
No Pain
Pain Goal: Remain pain free
Pain Plan:
Tylenol prn
Tomorrow's Labs & Rationales:
No labs: Pending DC
 
 
Dimas Douglas 18 1335:
Attending MD Review Statement
 
Attending Statement
Attending MD Statement: examined this patient, discuss w/resident/PA/NP, agreed 
w/resident/PA/NP, reviewed EMR data (avail), discussed with nursing, discussed 
with case mgmt
Attending Assessment/Plan:
pt doing ok and breathing better ,cont with current management for copd. 
 
PT recommending short term rehab. d/w case management and plan is to find place 
for short term rehab for the patient. 
 
d/w pt the care plan.